# Patient Record
Sex: MALE | Race: ASIAN | NOT HISPANIC OR LATINO | URBAN - METROPOLITAN AREA
[De-identification: names, ages, dates, MRNs, and addresses within clinical notes are randomized per-mention and may not be internally consistent; named-entity substitution may affect disease eponyms.]

---

## 2020-04-12 ENCOUNTER — INPATIENT (INPATIENT)
Facility: HOSPITAL | Age: 55
LOS: 6 days | Discharge: HOME | DRG: 177 | End: 2020-04-19
Attending: GENERAL ACUTE CARE HOSPITAL | Admitting: HOSPITALIST
Payer: MEDICAID

## 2020-04-12 VITALS
TEMPERATURE: 98 F | DIASTOLIC BLOOD PRESSURE: 97 MMHG | SYSTOLIC BLOOD PRESSURE: 154 MMHG | OXYGEN SATURATION: 100 % | WEIGHT: 160.06 LBS | HEART RATE: 124 BPM | RESPIRATION RATE: 30 BRPM | HEIGHT: 66 IN

## 2020-04-12 DIAGNOSIS — Z98.89 OTHER SPECIFIED POSTPROCEDURAL STATES: Chronic | ICD-10-CM

## 2020-04-12 DIAGNOSIS — R06.82 TACHYPNEA, NOT ELSEWHERE CLASSIFIED: ICD-10-CM

## 2020-04-12 LAB
APTT BLD: 37.3 SEC — HIGH (ref 27.5–36.3)
BASOPHILS # BLD AUTO: 0.01 K/UL — SIGNIFICANT CHANGE UP (ref 0–0.2)
BASOPHILS NFR BLD AUTO: 0.1 % — SIGNIFICANT CHANGE UP (ref 0–2)
D DIMER BLD IA.RAPID-MCNC: <150 NG/ML DDU — SIGNIFICANT CHANGE UP
EOSINOPHIL # BLD AUTO: 0.01 K/UL — SIGNIFICANT CHANGE UP (ref 0–0.5)
EOSINOPHIL NFR BLD AUTO: 0.1 % — SIGNIFICANT CHANGE UP (ref 0–6)
GAS PNL BLDV: SIGNIFICANT CHANGE UP
HCT VFR BLD CALC: 37.2 % — LOW (ref 39–50)
HGB BLD-MCNC: 12.3 G/DL — LOW (ref 13–17)
IMM GRANULOCYTES NFR BLD AUTO: 1.6 % — HIGH (ref 0–1.5)
INR BLD: 1.13 RATIO — SIGNIFICANT CHANGE UP (ref 0.88–1.16)
LYMPHOCYTES # BLD AUTO: 1.82 K/UL — SIGNIFICANT CHANGE UP (ref 1–3.3)
LYMPHOCYTES # BLD AUTO: 15.3 % — SIGNIFICANT CHANGE UP (ref 13–44)
MCHC RBC-ENTMCNC: 26.9 PG — LOW (ref 27–34)
MCHC RBC-ENTMCNC: 33.1 GM/DL — SIGNIFICANT CHANGE UP (ref 32–36)
MCV RBC AUTO: 81.4 FL — SIGNIFICANT CHANGE UP (ref 80–100)
MONOCYTES # BLD AUTO: 0.25 K/UL — SIGNIFICANT CHANGE UP (ref 0–0.9)
MONOCYTES NFR BLD AUTO: 2.1 % — SIGNIFICANT CHANGE UP (ref 2–14)
NEUTROPHILS # BLD AUTO: 9.65 K/UL — HIGH (ref 1.8–7.4)
NEUTROPHILS NFR BLD AUTO: 80.8 % — HIGH (ref 43–77)
NRBC # BLD: 0 /100 WBCS — SIGNIFICANT CHANGE UP (ref 0–0)
NT-PROBNP SERPL-SCNC: 114 PG/ML — SIGNIFICANT CHANGE UP (ref 0–300)
PLATELET # BLD AUTO: 235 K/UL — SIGNIFICANT CHANGE UP (ref 150–400)
PROTHROM AB SERPL-ACNC: 13.1 SEC — HIGH (ref 10–12.9)
RBC # BLD: 4.57 M/UL — SIGNIFICANT CHANGE UP (ref 4.2–5.8)
RBC # FLD: 14 % — SIGNIFICANT CHANGE UP (ref 10.3–14.5)
TROPONIN T, HIGH SENSITIVITY RESULT: <6 NG/L — SIGNIFICANT CHANGE UP (ref 0–51)
WBC # BLD: 11.93 K/UL — HIGH (ref 3.8–10.5)
WBC # FLD AUTO: 11.93 K/UL — HIGH (ref 3.8–10.5)

## 2020-04-12 PROCEDURE — 71045 X-RAY EXAM CHEST 1 VIEW: CPT | Mod: 26

## 2020-04-12 PROCEDURE — 99284 EMERGENCY DEPT VISIT MOD MDM: CPT

## 2020-04-12 PROCEDURE — 93010 ELECTROCARDIOGRAM REPORT: CPT

## 2020-04-12 RX ORDER — ACETAMINOPHEN 500 MG
650 TABLET ORAL EVERY 6 HOURS
Refills: 0 | Status: DISCONTINUED | OUTPATIENT
Start: 2020-04-12 | End: 2020-04-19

## 2020-04-12 RX ORDER — ACETAMINOPHEN 500 MG
975 TABLET ORAL ONCE
Refills: 0 | Status: COMPLETED | OUTPATIENT
Start: 2020-04-12 | End: 2020-04-12

## 2020-04-12 RX ADMIN — Medication 975 MILLIGRAM(S): at 22:30

## 2020-04-12 NOTE — ED ADULT NURSE NOTE - BREATHING
ANTICOAGULATION FOLLOW-UP CLINIC VISIT    Patient Name:  Hermilo Velasquez  Date:  9/26/2018  Contact Type:  Face to Face    SUBJECTIVE:     Patient Findings     Positives Unexplained INR or factor level change           OBJECTIVE    INR Protime   Date Value Ref Range Status   09/26/2018 3.8 (A) 0.86 - 1.14 Final       ASSESSMENT / PLAN  INR assessment SUPRA    Recheck INR In: 1 WEEK    INR Location Clinic      Anticoagulation Summary as of 9/26/2018     INR goal 2.0-3.0   Today's INR 3.8!   Warfarin maintenance plan 5 mg (5 mg x 1) every day   Full warfarin instructions 5 mg every day   Weekly warfarin total 35 mg   Weekly max warfarin dose 45 mg   Plan last modified Janneth Allen RN (8/22/2017)   Next INR check 10/3/2018   Target end date Indefinite    Indications   Long-term (current) use of anticoagulants [Z79.01] [Z79.01]  Pulmonary embolism  bilateral (H) [I26.99]  Paroxysmal atrial fibrillation (H) [I48.0]         Anticoagulation Episode Summary     INR check location [facility]    Preferred lab     Send INR reminders to [facility] ANTICOAGUL[facility]    Comments       Anticoagulation Care Providers     Provider Role Specialty Phone number    Karson Bishop MD Responsible Internal Medicine [phone]            See the Encounter Report to view Anticoagulation Flowsheet and Dosing Calendar (Go to Encounters tab in chart review, and find the Anticoagulation Therapy Visit)    Pt is 3.8 today. Discussed with Yamilex Gutierrez RN. Will have pt take 5 mg daily and recheck in 1 week. Pt given a handout of signs and sxs of bleed and stroke. Pt denies any changes in health, diet,medication or activity.    Mesha Bah, RN                spontaneous/dyspnea upon exertion

## 2020-04-12 NOTE — ED PROVIDER NOTE - PHYSICAL EXAMINATION
Gen: closing eyes, slow to answer questions, sad affect. well developed male  Head: NCAT  HEENT: PERRL, MMM, normal conjunctiva, anicteric, neck supple  Lung: CTAB, no adventitious sounds  CV: tachycardic, no murmurs, rubs or gallops  Abd: soft, NTND, no rebound or guarding, no CVAT  MSK: No edema, no visible deformities  Neuro: No focal neurologic deficits. CN II-XII grossly intact. 5/5 strength and normal sensation in all extremities.  Skin: Warm and dry, no evidence of rash  Psych: anxious and sad mood and affect

## 2020-04-12 NOTE — ED PROVIDER NOTE - OBJECTIVE STATEMENT
54yo M with no pmh presents with SOB. 7 days of fevers, cough, SOB. yesterday had episode of n/v/d, no blood appreciated. Wife works at [employer], with similar sxs. Brother recently passed away from COVID, patient is concerned due to recent death. +chest pressure, EKG by EMS WNL.

## 2020-04-12 NOTE — ED PROVIDER NOTE - ATTENDING CONTRIBUTION TO CARE
MD Parkinson:  patient seen and evaluated with the resident.  I was present for key portions of the History & Physical, and I agree with the Impression & Plan.  54 yo M c/o cough, SOB, body aches, runny nose, and fever.  Context:  +wife with COVID; brother just  of COVID after 1mos in an ICU.  Associated Symptoms:  +MCDONALD, & chest tightness  Duration of symptoms: 1wk  Modifiers:  laying flat, exertion.  VS:  92% on ambulation, 96% at rest.  Physical Exam:  adult M, very SOB-appearing, NCAT, mild respiratory distress, +bibasilar crackles, no lower extremity edema.  Impression/Plan:  Viral pneumonia with hypoxia, either due to a pending or confirmed COVID infection.   The patient's SOB, and work of breathing are severe enough to warrant concern for potential respiratory failure/decompensation in the near future, and therefore should be admitted to the hospital.    Plan:  Will balance aggressive oxygen therapy with CDC/ID policies to limit the viral aerosolize risk to health care providers.  Limit IVF unless hypotension requires resuscitation, as this may accelerate the ARDS process.  COVID19 swab was sent as per hospital protocol.

## 2020-04-12 NOTE — ED ADULT NURSE NOTE - OBJECTIVE STATEMENT
56 yo M w/ no PMHx presents to ED via EMS from home c/o SOB/cough/body aches. Pt reports symptoms began last week, Monday. Last fever known was yesterday to 101, last took tylenol today at 1600. Pt reports mild chest pain w/ breathing. EMS reports pt SpO2 96% on room air at rest, dropped to 90% on room air after ambulating to ambulance for transport. Pt wife at home w/ similar symptoms, was swabbed for covid yesterday, pending results. Pt brother  of covid 1 week ago. Pt reports dry cough. No recent travel reported. Pt reports occasional diarrhea over past several days. No changes in PO intake. Pt denies any N/V, fever, chills, urinary complaints, constipation, HA, dizziness, weakness. Pt A&Ox4, lungs CTA, +central pulses. Abdomen soft, not tender, not distended. Ambulating w/ steady gait, safety and comfort maintained, no acute distress noted at this time. 56 yo M w/ no PMHx presents to ED via EMS from home c/o SOB/cough/body aches. Pt reports symptoms began last week, Monday. Last fever known was yesterday to 101, last took tylenol today at 1600. Pt reports mild chest pain w/ breathing. EMS reports pt SpO2 96% on room air at rest, dropped to 90% on room air after ambulating to ambulance for transport. Pt wife works at [employer] is at home w/ similar symptoms , was swabbed for covid yesterday, pending results. Pt brother  of covid 1 week ago. Pt reports dry cough. No recent travel reported. Pt reports occasional diarrhea yesterday, resolved today. No changes in PO intake. Pt arrives on O2 4 LPM via nasal canula by EMS, IV access established by EMS 20G R AC w/ 1L NS administered PTA. Upon arrival pt tachycardic and tachypneic. Sinus tachycardia on cardiac monitor. Pt denies any N/V, fever, chills, urinary complaints, constipation, HA, dizziness, weakness. Pt A&Ox4, lungs CTA, +central pulses. Abdomen soft, not tender, not distended. Ambulating w/ steady gait, safety and comfort maintained.

## 2020-04-12 NOTE — ED PROVIDER NOTE - NS ED ROS FT
ROS: denies HA, weakness, dizziness, diaphoresis, abdominal pain, joint pain, neuro deficits, dysuria/hematuria, rash    +sob, chest pressure, f/c, cough, diarrhea

## 2020-04-13 DIAGNOSIS — R74.0 NONSPECIFIC ELEVATION OF LEVELS OF TRANSAMINASE AND LACTIC ACID DEHYDROGENASE [LDH]: ICD-10-CM

## 2020-04-13 DIAGNOSIS — L40.9 PSORIASIS, UNSPECIFIED: ICD-10-CM

## 2020-04-13 DIAGNOSIS — R68.89 OTHER GENERAL SYMPTOMS AND SIGNS: ICD-10-CM

## 2020-04-13 DIAGNOSIS — R73.9 HYPERGLYCEMIA, UNSPECIFIED: ICD-10-CM

## 2020-04-13 DIAGNOSIS — Z29.9 ENCOUNTER FOR PROPHYLACTIC MEASURES, UNSPECIFIED: ICD-10-CM

## 2020-04-13 DIAGNOSIS — Z02.9 ENCOUNTER FOR ADMINISTRATIVE EXAMINATIONS, UNSPECIFIED: ICD-10-CM

## 2020-04-13 DIAGNOSIS — E87.6 HYPOKALEMIA: ICD-10-CM

## 2020-04-13 LAB
ALBUMIN SERPL ELPH-MCNC: 3.3 G/DL — SIGNIFICANT CHANGE UP (ref 3.3–5)
ALBUMIN SERPL ELPH-MCNC: 3.4 G/DL — SIGNIFICANT CHANGE UP (ref 3.3–5)
ALP SERPL-CCNC: 127 U/L — HIGH (ref 40–120)
ALP SERPL-CCNC: 127 U/L — HIGH (ref 40–120)
ALT FLD-CCNC: 123 U/L — HIGH (ref 10–45)
ALT FLD-CCNC: 143 U/L — HIGH (ref 10–45)
ANION GAP SERPL CALC-SCNC: 15 MMOL/L — SIGNIFICANT CHANGE UP (ref 5–17)
ANION GAP SERPL CALC-SCNC: 17 MMOL/L — SIGNIFICANT CHANGE UP (ref 5–17)
AST SERPL-CCNC: 123 U/L — HIGH (ref 10–40)
AST SERPL-CCNC: 156 U/L — HIGH (ref 10–40)
BILIRUB SERPL-MCNC: 0.3 MG/DL — SIGNIFICANT CHANGE UP (ref 0.2–1.2)
BILIRUB SERPL-MCNC: 0.4 MG/DL — SIGNIFICANT CHANGE UP (ref 0.2–1.2)
BUN SERPL-MCNC: 5 MG/DL — LOW (ref 7–23)
BUN SERPL-MCNC: 7 MG/DL — SIGNIFICANT CHANGE UP (ref 7–23)
CALCIUM SERPL-MCNC: 8.6 MG/DL — SIGNIFICANT CHANGE UP (ref 8.4–10.5)
CALCIUM SERPL-MCNC: 8.8 MG/DL — SIGNIFICANT CHANGE UP (ref 8.4–10.5)
CHLORIDE SERPL-SCNC: 100 MMOL/L — SIGNIFICANT CHANGE UP (ref 96–108)
CHLORIDE SERPL-SCNC: 97 MMOL/L — SIGNIFICANT CHANGE UP (ref 96–108)
CK SERPL-CCNC: 111 U/L — SIGNIFICANT CHANGE UP (ref 30–200)
CO2 SERPL-SCNC: 20 MMOL/L — LOW (ref 22–31)
CO2 SERPL-SCNC: 22 MMOL/L — SIGNIFICANT CHANGE UP (ref 22–31)
CREAT SERPL-MCNC: 0.56 MG/DL — SIGNIFICANT CHANGE UP (ref 0.5–1.3)
CREAT SERPL-MCNC: 0.7 MG/DL — SIGNIFICANT CHANGE UP (ref 0.5–1.3)
CRP SERPL-MCNC: 23.06 MG/DL — HIGH (ref 0–0.4)
FERRITIN SERPL-MCNC: 1839 NG/ML — HIGH (ref 30–400)
GLUCOSE SERPL-MCNC: 136 MG/DL — HIGH (ref 70–99)
GLUCOSE SERPL-MCNC: 146 MG/DL — HIGH (ref 70–99)
HAV IGM SER-ACNC: SIGNIFICANT CHANGE UP
HBV CORE IGM SER-ACNC: SIGNIFICANT CHANGE UP
HBV SURFACE AG SER-ACNC: SIGNIFICANT CHANGE UP
HCT VFR BLD CALC: 38.1 % — LOW (ref 39–50)
HCV AB S/CO SERPL IA: 0.13 S/CO — SIGNIFICANT CHANGE UP (ref 0–0.99)
HCV AB SERPL-IMP: SIGNIFICANT CHANGE UP
HGB BLD-MCNC: 12.7 G/DL — LOW (ref 13–17)
LDH SERPL L TO P-CCNC: 447 U/L — HIGH (ref 50–242)
MCHC RBC-ENTMCNC: 27 PG — SIGNIFICANT CHANGE UP (ref 27–34)
MCHC RBC-ENTMCNC: 33.3 GM/DL — SIGNIFICANT CHANGE UP (ref 32–36)
MCV RBC AUTO: 81.1 FL — SIGNIFICANT CHANGE UP (ref 80–100)
NRBC # BLD: 0 /100 WBCS — SIGNIFICANT CHANGE UP (ref 0–0)
PLATELET # BLD AUTO: 238 K/UL — SIGNIFICANT CHANGE UP (ref 150–400)
POTASSIUM SERPL-MCNC: 3.2 MMOL/L — LOW (ref 3.5–5.3)
POTASSIUM SERPL-MCNC: 4 MMOL/L — SIGNIFICANT CHANGE UP (ref 3.5–5.3)
POTASSIUM SERPL-SCNC: 3.2 MMOL/L — LOW (ref 3.5–5.3)
POTASSIUM SERPL-SCNC: 4 MMOL/L — SIGNIFICANT CHANGE UP (ref 3.5–5.3)
PROCALCITONIN SERPL-MCNC: 13.59 NG/ML — HIGH (ref 0.02–0.1)
PROT SERPL-MCNC: 7.1 G/DL — SIGNIFICANT CHANGE UP (ref 6–8.3)
PROT SERPL-MCNC: 7.2 G/DL — SIGNIFICANT CHANGE UP (ref 6–8.3)
RBC # BLD: 4.7 M/UL — SIGNIFICANT CHANGE UP (ref 4.2–5.8)
RBC # FLD: 14 % — SIGNIFICANT CHANGE UP (ref 10.3–14.5)
SARS-COV-2 RNA SPEC QL NAA+PROBE: DETECTED
SODIUM SERPL-SCNC: 135 MMOL/L — SIGNIFICANT CHANGE UP (ref 135–145)
SODIUM SERPL-SCNC: 136 MMOL/L — SIGNIFICANT CHANGE UP (ref 135–145)
TROPONIN T, HIGH SENSITIVITY RESULT: <6 NG/L — SIGNIFICANT CHANGE UP (ref 0–51)
WBC # BLD: 9.36 K/UL — SIGNIFICANT CHANGE UP (ref 3.8–10.5)
WBC # FLD AUTO: 9.36 K/UL — SIGNIFICANT CHANGE UP (ref 3.8–10.5)

## 2020-04-13 PROCEDURE — 99223 1ST HOSP IP/OBS HIGH 75: CPT

## 2020-04-13 PROCEDURE — 99253 IP/OBS CNSLTJ NEW/EST LOW 45: CPT

## 2020-04-13 PROCEDURE — 93010 ELECTROCARDIOGRAM REPORT: CPT

## 2020-04-13 PROCEDURE — 93010 ELECTROCARDIOGRAM REPORT: CPT | Mod: 77

## 2020-04-13 RX ORDER — DIPHENHYDRAMINE HCL 50 MG
50 CAPSULE ORAL ONCE
Refills: 0 | Status: DISCONTINUED | OUTPATIENT
Start: 2020-04-13 | End: 2020-04-19

## 2020-04-13 RX ORDER — EPINEPHRINE 0.3 MG/.3ML
0.3 INJECTION INTRAMUSCULAR; SUBCUTANEOUS ONCE
Refills: 0 | Status: DISCONTINUED | OUTPATIENT
Start: 2020-04-13 | End: 2020-04-19

## 2020-04-13 RX ORDER — ACETAMINOPHEN 500 MG
975 TABLET ORAL ONCE
Refills: 0 | Status: COMPLETED | OUTPATIENT
Start: 2020-04-13 | End: 2020-04-13

## 2020-04-13 RX ORDER — REMDESIVIR 5 MG/ML
200 INJECTION INTRAVENOUS ONCE
Refills: 0 | Status: COMPLETED | OUTPATIENT
Start: 2020-04-13 | End: 2020-04-13

## 2020-04-13 RX ORDER — SODIUM CHLORIDE 9 MG/ML
1000 INJECTION INTRAMUSCULAR; INTRAVENOUS; SUBCUTANEOUS
Refills: 0 | Status: DISCONTINUED | OUTPATIENT
Start: 2020-04-13 | End: 2020-04-13

## 2020-04-13 RX ORDER — REMDESIVIR 5 MG/ML
100 INJECTION INTRAVENOUS DAILY
Refills: 0 | Status: DISCONTINUED | OUTPATIENT
Start: 2020-04-14 | End: 2020-04-19

## 2020-04-13 RX ORDER — ENOXAPARIN SODIUM 100 MG/ML
40 INJECTION SUBCUTANEOUS DAILY
Refills: 0 | Status: DISCONTINUED | OUTPATIENT
Start: 2020-04-13 | End: 2020-04-14

## 2020-04-13 RX ORDER — POTASSIUM CHLORIDE 20 MEQ
20 PACKET (EA) ORAL
Refills: 0 | Status: COMPLETED | OUTPATIENT
Start: 2020-04-13 | End: 2020-04-13

## 2020-04-13 RX ADMIN — Medication 20 MILLIEQUIVALENT(S): at 01:38

## 2020-04-13 RX ADMIN — ENOXAPARIN SODIUM 40 MILLIGRAM(S): 100 INJECTION SUBCUTANEOUS at 12:38

## 2020-04-13 RX ADMIN — Medication 20 MILLIEQUIVALENT(S): at 00:45

## 2020-04-13 RX ADMIN — SODIUM CHLORIDE 70 MILLILITER(S): 9 INJECTION INTRAMUSCULAR; INTRAVENOUS; SUBCUTANEOUS at 02:49

## 2020-04-13 RX ADMIN — REMDESIVIR 500 MILLIGRAM(S): 5 INJECTION INTRAVENOUS at 17:30

## 2020-04-13 RX ADMIN — Medication 650 MILLIGRAM(S): at 22:08

## 2020-04-13 RX ADMIN — Medication 650 MILLIGRAM(S): at 02:49

## 2020-04-13 RX ADMIN — Medication 975 MILLIGRAM(S): at 17:30

## 2020-04-13 NOTE — H&P ADULT - PROBLEM SELECTOR PLAN 1
See above.   COVID-19 assay pending.    Would consider CTT chest if assay negative (possible false negative) or with clinical change.   Will DEFER consideration of investigational therapies to the DAY PROVIDER.  Would consider formal ID evaluation in the AM.

## 2020-04-13 NOTE — CONSULT NOTE ADULT - SUBJECTIVE AND OBJECTIVE BOX
Patient is a 55y old  Male who presents with a chief complaint of Fever, cough, dyspnoea for 7 days, poor PO, chest pain. (13 Apr 2020 00:35)      HPI:  NIGHT HOSPITALIST:   Patient is a  54 y/o M--patient followed by his physician in [place], [place]--patient with a history of plaque psoriasis NOT on any systemic treatment or immunosuppressants for his psoriasis (on reported over the counter Rx but is not sure of Rx) with patient self referring to [place] following 7 days of persistent intermittent fever, dry cough, progressive dyspnoea with poor PO intake and intermittent chest pain.  Patient with recent loss of his brother from COVID-19 and patient's spouse works at [employer]. (13 Apr 2020 00:35)      PAST MEDICAL & SURGICAL HISTORY:  Psoriasis  S/P hemorrhoidectomy      Social history:   from [facility]  denies smoking or drinking             FAMILY HISTORY:  FH: diabetes mellitus      REVIEW OF SYSTEMS  General:	fevers , malaise  , chills    Skin:No rash  	  Ophthalmologic:Denies any visual complaints,discharge redness or photophobia  	  ENMT:No nasal discharge,headache,sinus congestion or throat pain.No dental complaints    Respiratory and Thorax:N cough, SOB  	  Cardiovascular:	No chest pain,palpitaions or dizziness    Gastrointestinal:	NO nausea,abdominal pain or diarrhea.    Genitourinary:	No dysuria,frequency. No flank pain    Musculoskeletal:	No joint swelling or pain.No weakness    Neurological:No confusion,diziness.No extremity weakness.No bladder or bowel incontinence	    Psychiatric:No delusions or hallucinations	    Hematology/Lymphatics:	No LN swelling.No gum bleeding     Endocrine:	No recent weight gain or loss.No abnormal heat/cold intolerance    Allergic/Immunologic:	No hives or rash     Allergies    No Known Allergies    Intolerances        Antimicrobials:       MEDICATIONS  (prior antimicrobials ):        MEDICATIONS  (STANDING):  enoxaparin Injectable 40 milliGRAM(s) SubCutaneous daily    MEDICATIONS  (PRN):  acetaminophen   Tablet .. 650 milliGRAM(s) Oral every 6 hours PRN Temp greater or equal to 38C (100.4F), Mild Pain (1 - 3)        Vital Signs Last 24 Hrs  T(C): 36.9 (13 Apr 2020 12:06), Max: 38.2 (13 Apr 2020 02:34)  T(F): 98.5 (13 Apr 2020 12:06), Max: 100.8 (13 Apr 2020 02:34)  HR: 115 (13 Apr 2020 12:06) (106 - 125)  BP: 138/89 (13 Apr 2020 12:06) (134/84 - 163/94)  BP(mean): --  RR: 20 (13 Apr 2020 12:06) (20 - 30)  SpO2: 97% (13 Apr 2020 12:06) (94% - 100%)  91 % on room air  PHYSICAL EXAM:Pleasant patient uncomfortable      Constitutional:Comfortable.Awake and alert  No cachexia     Eyes:PERRL EOMI.NO discharge or conjunctival injection    ENMT:No sinus tenderness.No thrush.No pharyngeal exudate     Neck:Supple,No LN,no JVD      Respiratory:Good air entry bilaterally,CTA    Cardiovascular:S1 S2  tachycardic    Gastrointestinal:Soft BS(+) no tenderness      Extremities: deformities of fingers from "arthritis"    Vascular:peripheral pulses felt    Neurological:AAO X 3,No grossly focal deficits    Skin: psoriatic plaques    Lymph Nodes:No palpable LNs    Musculoskeletal:No joint swelling or LOM    Psychiatric:Affect normal.                            12.7   9.36  )-----------( 238      ( 13 Apr 2020 12:25 )             38.1     LIVER FUNCTIONS - ( 13 Apr 2020 12:25 )  Alb: 3.3 g/dL / Pro: 7.2 g/dL / ALK PHOS: 127 U/L / ALT: 123 U/L / AST: 123 U/L / GGT: x             04-13    135  |  100  |  7   ----------------------------<  146<H>  4.0   |  20<L>  |  0.56    Ca    8.8      13 Apr 2020 12:25    TPro  7.2  /  Alb  3.3  /  TBili  0.3  /  DBili  x   /  AST  123<H>  /  ALT  123<H>  /  AlkPhos  127<H>  04-13    Creatine Kinase, Serum: 111 U/L (04-12 @ 22:54)      Procalcitonin, Serum (04.12.20 @ 22:54)    Procalcitonin, Serum: 13.59: This assay is intended for use to determine the change of PCT over time  as an aid in assessing the cumulative 28-day risk of all-cause mortality  for patients diagnosed with severe sepsis or septic shock in the ICU, or  when obtained in the emergency department or other medical wards prior to  ICU admission. This assay was performed by the Roche Elecsys BRAHMS PCT  assay. ng/mL      MICROBIOLOGY:    COVID-19 PCR . (04.12.20 @ 23:05)    COVID-19 PCR: Detected: This test has been validated by [employer] to be accurate;  though it has not been FDA cleared/approved by the usual pathway.  As with all laboratory tests, results should be correlated with clinical  findings.  https://www.fda.gov/media/923847/download  https://www.fda.gov/media/507928/download          Radiology:    < from: Xray Chest 1 View- PORTABLE-Urgent (04.12.20 @ 22:55) >  EXAM:  XR CHEST PORTABLE URGENT 1V                            PROCEDURE DATE:  04/12/2020            INTERPRETATION:  CLINICAL INFORMATION: Shortness of breath    TECHNIQUE: AP view of the chest.    COMPARISON: Chest x-ray dated 6/3/2011    FINDINGS:     Bilateral patchy opacities in a peripheral distribution which can be seen in the setting of viral pneumonia.  The cardiomediastinal silhouette is normal in size  The visualized osseous structures are unremarkable for age.    IMPRESSION:     Bilateral patchy peripheral opacities which can be seen in the setting of viral pneumonia such as COVID 19.        LIVIA LAMBERT M.D., RADIOLOGY RESIDENT  This document has been electronically signed.  LINDA CEDILLO M.D., ATTENDING RADIOLOGIST  This document has been electronically signed. Apr 13 2020  6:43AM        < end of copied text >    Urinalysis (12.18.15 @ 07:50)    Color: PLYEL    Urine Appearance: CLEAR    Glucose: NEGATIVE    Bilirubin: NEGATIVE    Ketone - Urine: NEGATIVE    Specific Gravity: 1.009    Blood: NEGATIVE    pH - Urine: 6.0    Protein, Urine: NEGATIVE    Urobilinogen: NORMAL E.U.    Nitrite: NEGATIVE    Leukocyte Esterase Concentration: NEGATIVE    White Blood Cell - Urine: 0-2    Mucus: FEW    Squamous Epithelial: OCC

## 2020-04-13 NOTE — H&P ADULT - NSHPPHYSICALEXAM_GEN_ALL_CORE
Physical exam with a nontoxic but ill appearing M.    Tm 100.4F.   HR  110    RR 16.    BP  137/92    100% on NC.    HEENT< PERRL< EOMI  Neck supple  NO thyromegaly  Chest with decreased breath sounds at the bases  Cor s1 s2 tachycardia.  Abdomen soft nontender, normal bowel sounds. Physical exam with a nontoxic but ill appearing M.    Tm 100.4F.   HR  110    RR 16.    BP  137/92    100% on NC.    HEENT< PERRL< EOMI  Neck supple  NO thyromegaly  Chest with decreased breath sounds at the bases  Cor s1 s2 tachycardia.  Abdomen soft nontender, normal bowel sounds.  Skin, dry,  healed areas of plaque psoriasis LEFT elbow and b/L anterior distal shins.  NO clear evidence of infection.  Neurologic AxOx3.  Speech fluent.  Cognition intact.  UE/LE 5/5.  NO SI/HI> but tearful.

## 2020-04-13 NOTE — CONSULT NOTE ADULT - ASSESSMENT
Patient is a  54 y/o M--patient followed by his physician in [place], [place]--patient with a history of plaque psoriasis NOT on any systemic treatment or immunosuppressants for his psoriasis (on reported over the counter Rx but is not sure of Rx) with patient self referring to [place] following 7 days of persistent intermittent fever, dry cough, progressive dyspnoea with poor PO intake and intermittent chest pain.  Patient with recent loss of his brother from COVID-19 and patient's spouse works at [employer]. (13 Apr 2020 00:35)    Pt has agreed to participate in a clinical trial   In order to participate in this clinical trial , the patient should not take concurrent treatment with other agents with actual or possible direct acting antiviral activity against SARS-CoV-2 such a chloroquine or hydroxychloroquine    Pt with elevated procalcitonin- blood cultures pending  check sputum culture  u/a is negative    please closely monitor markers of inflammation such as ferritin, d- dimer and CRP    Pt with elevated LFTs, likely from the COVID  will continue to monitor as well as renal function, while on trial drug    continue to monitor O2 requirements Patient is a  54 y/o M--patient followed by his physician in [place], [place]--patient with a history of plaque psoriasis NOT on any systemic treatment or immunosuppressants for his psoriasis (on reported over the counter Rx but is not sure of Rx) with patient self referring to [place] following 7 days of persistent intermittent fever, dry cough, progressive dyspnoea with poor PO intake and intermittent chest pain.  Patient with recent loss of his brother from COVID-19 and patient's spouse works at [employer]. (13 Apr 2020 00:35)    Pt has agreed to participate in a clinical trial   In order to participate in this clinical trial , the patient should not take concurrent treatment with other agents with actual or possible direct acting antiviral activity against SARS-CoV-2 such a chloroquine or hydroxychloroquine    Pt with elevated procalcitonin- blood cultures pending  check sputum culture  u/a is negative    please closely monitor markers of inflammation such as ferritin, d- dimer and CRP    Pt with elevated LFTs, likely from the COVID  will continue to monitor as well as renal function, while on trial drug    continue to monitor O2 requirements  Ordinal scale is 4

## 2020-04-13 NOTE — H&P ADULT - NSHPSOURCEINFOTX_GEN_ALL_CORE
Reviewed Medex with patient--patient is on no medications.  Left general message with spouse by phone. Reviewed Medex with patient--patient is on no medications.  Left general message with spouse by phone

## 2020-04-13 NOTE — H&P ADULT - NSHPLABSRESULTS_GEN_ALL_CORE
WBC 11.9.  80 % N, no bands.    Hgb 12.3    Platelets of 235K.    INR 1.1.    D dimer < 150    Lactate 2.0    Troponin nil x 1.    BNP nil.    COVID-19 PCR pending.    K+ 3.2>>supplementation ordered.    Random glucose of 132.    Cr 0.7.    Alb 3.4.    Alk phos 127      LDH  447  Procalcitonin 13    EKG tracing reviewed with sinus tachycardia at 123  QTc 472.    Sohail radiograph reviewed with bilateral patchy opacities.

## 2020-04-13 NOTE — H&P ADULT - ASSESSMENT
NIGHT HOSPITALIST:   History of plaque psoriasis but not on or previously on systemic treatment in the setting of suspected COVID-19 infection in the setting of possible community spread with patient's brother recently succumbing to COVID-19 and with spouse apparently works currently at [employer]>    COVID-19 assay sent>>would consider CTT chest if COVID-19 negative (then possible false negative) and would consider formal ID evaluation in the AM.   At present, patient with mixed indices of inflammatory markers (elevated procalcitonin but non-diagnostic D-dimer, BNP, lactate, no left shift or bandemia) to suggest concomitant bacterial superinfection>>will HOLD empiric IV antibiotics for now unless clinical change warrants as such.   Blood cultures sent.    Reviewed with patient the issue of current investigational therapies in the setting of possible or documented COVID-19 infection with patient not decided as such.   Will DEFER such investigational considerations for now, with the DAY PROVIDER to review, +/- ID input.    Will supplement K+ and check HgbA1c.   Patient wishes a clear diet, but will assume aspiration risk for now. NIGHT HOSPITALIST:   History of plaque psoriasis but not on or previously on systemic treatment for his psoriasis in the setting of suspected COVID-19 infection in the setting of possible community spread with patient's brother recently succumbing to COVID-19 and with spouse apparently works currently at [employer]>    COVID-19 assay sent>>would consider CTT chest if COVID-19 negative (then would be a possible false negative) and would consider formal ID evaluation in the AM.   At present, patient with mixed indices of inflammatory markers (elevated procalcitonin but non-diagnostic D-dimer, BNP, lactate, no left shift or bandemia) to suggest concomitant bacterial superinfection>>will HOLD empiric IV antibiotics for now unless clinical change warrants as such.   Blood cultures sent.    Reviewed with patient the issue of current investigational therapies in the setting of possible or documented COVID-19 infection with patient not decided as such.   Will DEFER such investigational considerations for now, with the DAY PROVIDER to review, +/- ID input.    Will supplement K+ and check HgbA1c.   Patient wishes a clear diet, but will assume aspiration risk for now.

## 2020-04-13 NOTE — H&P ADULT - ATTENDING COMMENTS
NIGHT HOSPITALIST:    Patient / spouse aware of course and agree with plan/care as above.   Given patient's comorbidities, patient's prognosis is guarded.   Emotional support provided to patient.    Care assumed by the DAY PROVIDER.    Contact card provided to patient.   General message left with patient's spouse by phone.    Baldev Batres MD  [phone] NIGHT HOSPITALIST:    Patient / spouse aware of course and agree with plan/care as above.   Given patient's comorbidities, patient's prognosis is guarded.   Emotional support provided to patient.    Care reviewed with covering NP, Chasity.  Care assumed by the DAY PROVIDER.    Contact card provided to patient.   General message left with patient's spouse by phone.    Baldev Batres MD  [phone]

## 2020-04-13 NOTE — CHART NOTE - NSCHARTNOTEFT_GEN_A_CORE
Pt provided informed consent for clinical trial enrollment.  Details of the study explained  Pt given ample time to ask questions and read consent . A copy of the consent was left with the patient. Pt verbalized understanding and all questions were answered.

## 2020-04-13 NOTE — H&P ADULT - NSHPREVIEWOFSYSTEMS_GEN_ALL_CORE
Fevers as above.  NO chest pain/pressure at present.  NO palpitations.  NO HA, no focal weakness.  NO abdominal pain.   Occasional nausea.   No red blood per rectum or melena.  NO diarrhoea.  No change in chronic plaque psoriasis.    NO thyroid symptoms.  No joint pain.  No dysuria, no haematuria.  NO SI/HI>  Sick contacts as above.

## 2020-04-13 NOTE — H&P ADULT - HISTORY OF PRESENT ILLNESS
NIGHT HOSPITALIST:   Patient UNKNOWN to me previously, assigned to me at this point via the ER to admit this 54 y/o M--patient followed by his physician in [place], [place]--patient with a history of plaque psoriasis NOT on any systemic treatment (on reported over the counter Rx but is not sure of Rx) with patient self referring to [place] following 7 days of persistent intermittent fever, dry cough, progressive dyspnoea with poor PO intake and intermittent chest pain.  Patient with recent loss of his brother from COVID-19 and patient's spouse works at [employer]. NIGHT HOSPITALIST:   Patient UNKNOWN to me previously, assigned to me at this point via the ER to admit this 54 y/o M--patient followed by his physician in [place], [place]--patient with a history of plaque psoriasis NOT on any systemic treatment or immunosuppressants for his psoriasis (on reported over the counter Rx but is not sure of Rx) with patient self referring to [place] following 7 days of persistent intermittent fever, dry cough, progressive dyspnoea with poor PO intake and intermittent chest pain.  Patient with recent loss of his brother from COVID-19 and patient's spouse works at [employer].

## 2020-04-14 LAB
ALBUMIN SERPL ELPH-MCNC: 2.8 G/DL — LOW (ref 3.3–5)
ALP SERPL-CCNC: 123 U/L — HIGH (ref 40–120)
ALT FLD-CCNC: 88 U/L — HIGH (ref 10–45)
ANION GAP SERPL CALC-SCNC: 15 MMOL/L — SIGNIFICANT CHANGE UP (ref 5–17)
ANION GAP SERPL CALC-SCNC: 15 MMOL/L — SIGNIFICANT CHANGE UP (ref 5–17)
AST SERPL-CCNC: 76 U/L — HIGH (ref 10–40)
BILIRUB SERPL-MCNC: 0.4 MG/DL — SIGNIFICANT CHANGE UP (ref 0.2–1.2)
BUN SERPL-MCNC: 11 MG/DL — SIGNIFICANT CHANGE UP (ref 7–23)
BUN SERPL-MCNC: 13 MG/DL — SIGNIFICANT CHANGE UP (ref 7–23)
CALCIUM SERPL-MCNC: 8.7 MG/DL — SIGNIFICANT CHANGE UP (ref 8.4–10.5)
CALCIUM SERPL-MCNC: 8.9 MG/DL — SIGNIFICANT CHANGE UP (ref 8.4–10.5)
CHLORIDE SERPL-SCNC: 101 MMOL/L — SIGNIFICANT CHANGE UP (ref 96–108)
CHLORIDE SERPL-SCNC: 102 MMOL/L — SIGNIFICANT CHANGE UP (ref 96–108)
CO2 SERPL-SCNC: 18 MMOL/L — LOW (ref 22–31)
CO2 SERPL-SCNC: 19 MMOL/L — LOW (ref 22–31)
CREAT SERPL-MCNC: 0.56 MG/DL — SIGNIFICANT CHANGE UP (ref 0.5–1.3)
CREAT SERPL-MCNC: 0.64 MG/DL — SIGNIFICANT CHANGE UP (ref 0.5–1.3)
D DIMER BLD IA.RAPID-MCNC: <150 NG/ML DDU — SIGNIFICANT CHANGE UP
GLUCOSE SERPL-MCNC: 80 MG/DL — SIGNIFICANT CHANGE UP (ref 70–99)
GLUCOSE SERPL-MCNC: 87 MG/DL — SIGNIFICANT CHANGE UP (ref 70–99)
HCT VFR BLD CALC: 39.8 % — SIGNIFICANT CHANGE UP (ref 39–50)
HGB BLD-MCNC: 12.6 G/DL — LOW (ref 13–17)
MAGNESIUM SERPL-MCNC: 2.3 MG/DL — SIGNIFICANT CHANGE UP (ref 1.6–2.6)
MCHC RBC-ENTMCNC: 26.3 PG — LOW (ref 27–34)
MCHC RBC-ENTMCNC: 31.7 GM/DL — LOW (ref 32–36)
MCV RBC AUTO: 83.1 FL — SIGNIFICANT CHANGE UP (ref 80–100)
NRBC # BLD: 0 /100 WBCS — SIGNIFICANT CHANGE UP (ref 0–0)
PHOSPHATE SERPL-MCNC: 4.4 MG/DL — SIGNIFICANT CHANGE UP (ref 2.5–4.5)
PLATELET # BLD AUTO: 297 K/UL — SIGNIFICANT CHANGE UP (ref 150–400)
POTASSIUM SERPL-MCNC: 4 MMOL/L — SIGNIFICANT CHANGE UP (ref 3.5–5.3)
POTASSIUM SERPL-MCNC: 4 MMOL/L — SIGNIFICANT CHANGE UP (ref 3.5–5.3)
POTASSIUM SERPL-SCNC: 4 MMOL/L — SIGNIFICANT CHANGE UP (ref 3.5–5.3)
POTASSIUM SERPL-SCNC: 4 MMOL/L — SIGNIFICANT CHANGE UP (ref 3.5–5.3)
PROT SERPL-MCNC: 6.8 G/DL — SIGNIFICANT CHANGE UP (ref 6–8.3)
RBC # BLD: 4.79 M/UL — SIGNIFICANT CHANGE UP (ref 4.2–5.8)
RBC # FLD: 14.3 % — SIGNIFICANT CHANGE UP (ref 10.3–14.5)
SODIUM SERPL-SCNC: 134 MMOL/L — LOW (ref 135–145)
SODIUM SERPL-SCNC: 136 MMOL/L — SIGNIFICANT CHANGE UP (ref 135–145)
WBC # BLD: 8.44 K/UL — SIGNIFICANT CHANGE UP (ref 3.8–10.5)
WBC # FLD AUTO: 8.44 K/UL — SIGNIFICANT CHANGE UP (ref 3.8–10.5)

## 2020-04-14 PROCEDURE — 99255 IP/OBS CONSLTJ NEW/EST HI 80: CPT | Mod: GC

## 2020-04-14 PROCEDURE — 99232 SBSQ HOSP IP/OBS MODERATE 35: CPT

## 2020-04-14 RX ORDER — METOPROLOL TARTRATE 50 MG
12.5 TABLET ORAL EVERY 6 HOURS
Refills: 0 | Status: DISCONTINUED | OUTPATIENT
Start: 2020-04-14 | End: 2020-04-15

## 2020-04-14 RX ORDER — DIGOXIN 250 MCG
0.12 TABLET ORAL DAILY
Refills: 0 | Status: DISCONTINUED | OUTPATIENT
Start: 2020-04-14 | End: 2020-04-15

## 2020-04-14 RX ORDER — METOPROLOL TARTRATE 50 MG
5 TABLET ORAL ONCE
Refills: 0 | Status: COMPLETED | OUTPATIENT
Start: 2020-04-14 | End: 2020-04-14

## 2020-04-14 RX ORDER — DIGOXIN 250 MCG
0.5 TABLET ORAL ONCE
Refills: 0 | Status: COMPLETED | OUTPATIENT
Start: 2020-04-14 | End: 2020-04-14

## 2020-04-14 RX ORDER — ENOXAPARIN SODIUM 100 MG/ML
40 INJECTION SUBCUTANEOUS
Refills: 0 | Status: DISCONTINUED | OUTPATIENT
Start: 2020-04-14 | End: 2020-04-19

## 2020-04-14 RX ORDER — SODIUM CHLORIDE 9 MG/ML
250 INJECTION INTRAMUSCULAR; INTRAVENOUS; SUBCUTANEOUS ONCE
Refills: 0 | Status: COMPLETED | OUTPATIENT
Start: 2020-04-14 | End: 2020-04-14

## 2020-04-14 RX ORDER — DIGOXIN 250 MCG
0.25 TABLET ORAL EVERY 8 HOURS
Refills: 0 | Status: COMPLETED | OUTPATIENT
Start: 2020-04-14 | End: 2020-04-14

## 2020-04-14 RX ORDER — METOPROLOL TARTRATE 50 MG
5 TABLET ORAL ONCE
Refills: 0 | Status: DISCONTINUED | OUTPATIENT
Start: 2020-04-14 | End: 2020-04-14

## 2020-04-14 RX ADMIN — SODIUM CHLORIDE 500 MILLILITER(S): 9 INJECTION INTRAMUSCULAR; INTRAVENOUS; SUBCUTANEOUS at 02:20

## 2020-04-14 RX ADMIN — ENOXAPARIN SODIUM 40 MILLIGRAM(S): 100 INJECTION SUBCUTANEOUS at 22:13

## 2020-04-14 RX ADMIN — Medication 650 MILLIGRAM(S): at 13:18

## 2020-04-14 RX ADMIN — ENOXAPARIN SODIUM 40 MILLIGRAM(S): 100 INJECTION SUBCUTANEOUS at 13:17

## 2020-04-14 RX ADMIN — REMDESIVIR 500 MILLIGRAM(S): 5 INJECTION INTRAVENOUS at 15:30

## 2020-04-14 RX ADMIN — Medication 0.25 MILLIGRAM(S): at 16:16

## 2020-04-14 RX ADMIN — Medication 0.5 MILLIGRAM(S): at 02:16

## 2020-04-14 RX ADMIN — Medication 0.25 MILLIGRAM(S): at 08:56

## 2020-04-14 RX ADMIN — Medication 5 MILLIGRAM(S): at 00:17

## 2020-04-14 RX ADMIN — Medication 5 MILLIGRAM(S): at 04:49

## 2020-04-14 NOTE — CONSULT NOTE ADULT - NSHPATTENDINGPLANDISCUSS_GEN_ALL_CORE
Jennifer Meyer- NP
Call Cardiology Fellow or Attending as listed on [email or web address] password: [email or web address].

## 2020-04-14 NOTE — CONSULT NOTE ADULT - ATTENDING COMMENTS
Carolyn Leahy M.D. ,   Pager [phone]     after 5PM/ weekends [phone]
55 year old man with COVID-19 pneumonia and onset of atrial fibrillation with rapid ventricular rate. Received IV metoprolol with limited response, then digoxin load and subsequent reverted to sinus rhythm. Conversion strip reviewed, simply terminated, no pause as sinus rhythm resumed and maintained.

## 2020-04-14 NOTE — CONSULT NOTE ADULT - REASON FOR ADMISSION
Fever, cough, dyspnoea for 7 days, poor PO, chest pain.
Fever, cough, dyspnoea for 7 days, poor PO, chest pain.

## 2020-04-14 NOTE — PROGRESS NOTE ADULT - SUBJECTIVE AND OBJECTIVE BOX
Patient is a 55y old  Male who presents with a chief complaint of Fever, cough, dyspnoea for 7 days, poor PO, chest pain. (14 Apr 2020 11:21)                                                               INTERVAL HPI/OVERNIGHT EVENTS:    REVIEW OF SYSTEMS:     CONSTITUTIONAL: No weakness, fevers or chills  RESPIRATORY: No cough, wheezing,  No shortness of breath  CARDIOVASCULAR: No chest pain or palpitations  GASTROINTESTINAL: No abdominal pain  . No nausea, vomiting, or hematemesis; No diarrhea or constipation. No melena or hematochezia.  GENITOURINARY: No dysuria, frequency or hematuria  NEUROLOGICAL: No numbness or weakness  SKIN: No itching, burning, rashes, or lesions                                                                                                                                                                                                                                                                                 Medications:  MEDICATIONS  (STANDING):  digoxin     Tablet 0.125 milliGRAM(s) Oral daily  enoxaparin Injectable 40 milliGRAM(s) SubCutaneous daily  remdesivir IVPB ([id number]) 100 milliGRAM(s) IV Intermittent daily    MEDICATIONS  (PRN):  acetaminophen   Tablet .. 650 milliGRAM(s) Oral every 6 hours PRN Temp greater or equal to 38C (100.4F), Mild Pain (1 - 3)  diphenhydrAMINE   Injectable 50 milliGRAM(s) IV Push once PRN anaphylaxis to study drug  EPINEPHrine     1 mG/mL Injectable 0.3 milliGRAM(s) IntraMuscular once PRN anaphylaxis for study drug  metoprolol tartrate 12.5 milliGRAM(s) Oral every 6 hours PRN HR>120       Allergies    No Known Allergies    Intolerances      Vital Signs Last 24 Hrs  T(C): 37 (14 Apr 2020 12:47), Max: 39.4 (13 Apr 2020 16:50)  T(F): 98.6 (14 Apr 2020 12:47), Max: 103 (13 Apr 2020 16:50)  HR: 116 (14 Apr 2020 12:47) (82 - 190)  BP: 119/80 (14 Apr 2020 12:47) (84/54 - 129/85)  BP(mean): --  RR: 18 (14 Apr 2020 12:47) (17 - 32)  SpO2: 95% (14 Apr 2020 12:47) (94% - 98%)  CAPILLARY BLOOD GLUCOSE          04-13 @ 07:01  -  04-14 @ 07:00  --------------------------------------------------------  IN: 780 mL / OUT: 1250 mL / NET: -470 mL    04-14 @ 07:01  -  04-14 @ 14:20  --------------------------------------------------------  IN: 120 mL / OUT: 250 mL / NET: -130 mL      Physical Exam:    Daily     Daily   General:  Well appearing, NAD, not cachetic  HEENT:  Nonicteric, PERRLA  CV:  RRR, S1S2   Lungs:  CTA B/L, no wheezes, rales, rhonchi  Abdomen:  Soft, non-tender, no distended, positive BS  Extremities:  2+ pulses, no c/c, no edema  Skin:  Warm and dry, no rashes  :  No henderson  Neuro:  AAOx3, non-focal, grossly intact                                                                                                                                                                                                                                                                                                LABS:                               12.6   8.44  )-----------( 297      ( 14 Apr 2020 06:40 )             39.8                      04-14    134<L>  |  101  |  13  ----------------------------<  80  4.0   |  18<L>  |  0.56    Ca    8.9      14 Apr 2020 06:39  Phos  4.4     04-14  Mg     2.3     04-14    TPro  6.8  /  Alb  2.8<L>  /  TBili  0.4  /  DBili  x   /  AST  76<H>  /  ALT  88<H>  /  AlkPhos  123<H>  04-14

## 2020-04-14 NOTE — PROGRESS NOTE ADULT - ASSESSMENT
Problem/Plan - 1:  ·  Problem: acute hypoxicv resp failllure sec to 2019 novel coronavirus infection.  Plan: See above.  now on trial with remdesivir   monitor for now   increase lovenox to bid   monitor inflamatory markers      Problem/Plan - 2:  ·  Problem: Psoriasis.  Plan: Not currently or previously on any disease modifying agents.     Problem/Plan - 3:  ·  Problem: Hypokalemia.  Plan: improved     Problem/Plan - 4:  ·  Problem: Transaminitis.  Plan: monitor     Problem/Plan - 5:  ·  Problem: Hyperglycemia.  Plan: Will check HgbA1C.     Problem/Plan - 6:  Problem: unclear why pt on digoxin : cont for now //attempt to clarify     full code

## 2020-04-14 NOTE — PROGRESS NOTE ADULT - ASSESSMENT
Patient is a  56 y/o M--patient followed by his physician in [place], [place]--patient with a history of plaque psoriasis NOT on any systemic treatment or immunosuppressants for his psoriasis (on reported over the counter Rx but is not sure of Rx) with patient self referring to [facility] following 7 days of persistent intermittent fever, dry cough, progressive dyspnoea with poor PO intake and intermittent chest pain.  Patient with recent loss of his brother from COVID-19 and patient's spouse works at [employer]. (13 Apr 2020 00:35)    Pt has agreed to participate in a clinical trial   In order to participate in this clinical trial , the patient should not take concurrent treatment with other agents with actual or possible direct acting antiviral activity against SARS-CoV-2 such a chloroquine or hydroxychloroquine    Pt with elevated procalcitonin- blood cultures pending  check sputum culture  u/a is negative    please closely monitor markers of inflammation such as ferritin, d- dimer and CRP    Pt with elevated LFTs, likely from the COVID  will continue to monitor as well as renal function, while on trial drug  LFTs are improved today c/w yesterday    continue to monitor O2 requirements  Ordinal scale is 4    Day # 2 clinical trial

## 2020-04-14 NOTE — CHART NOTE - NSCHARTNOTEFT_GEN_A_CORE
CC: New osnet Afib with RVR    Notified by RN pt HR is 190s and rhythm is rapid A.fib.  Pt seen and examined at bedside, lying comfortably in no acute distress (despite tachy), Pt reports slight chest pain that is not new and brought him into the hospital ED. Pt currently on no BB or other cardiac meds. Admits to CP and SOB.    ICU Vital Signs Last 24 Hrs  T(C): 36.8 (13 Apr 2020 23:35), Max: 39.4 (13 Apr 2020 16:50)  T(F): 98.2 (13 Apr 2020 23:35), Max: 103 (13 Apr 2020 16:50)  HR: 132 (14 Apr 2020 00:14) (106 - 190)  BP: 102/69 (14 Apr 2020 00:14) (102/69 - 144/94)  BP(mean): --  ABP: --  ABP(mean): --  RR: 32 (14 Apr 2020 00:14) (20 - 32)  SpO2: 96% (14 Apr 2020 00:14) (95% - 100%)    Physical exam:  General- AOx3 in NAD  Cardiac- irregular rhythm, tachy  Resp- CTA b/l  Ext- no b/l LE edema    Assessment:   54 YO male with PMHX of plaque psoriasis but not on or previously on systemic treatment for his psoriasis.  Pt has a negative cardiac hx as well in the setting of COVID-19 infection c/b new rapid afib.    Plan:  #New rapid Afib - likely 2/2 COVID-19 infection  - Lopressor 5 mg IVP  -More freq monitoring  -EKG- Rapid Afib with RVR HR:190  - Consult cardiology      Will continue to monitor  Attending notified  Will endorse to day team    MALAIKA FanC  Medicine team  [phone] CC: New osnet Afib with RVR    Notified by RN pt HR is 190s and rhythm is rapid A.fib.  Pt seen and examined at bedside, lying comfortably in no acute distress (despite tachy), Pt reports slight chest pain that is not new and brought him into the hospital ED. Pt currently on no BB or other cardiac meds. Admits to CP palpitations and SOB. Denies HA, abd pain, N/V.    ICU Vital Signs Last 24 Hrs  T(C): 36.8 (13 Apr 2020 23:35), Max: 39.4 (13 Apr 2020 16:50)  T(F): 98.2 (13 Apr 2020 23:35), Max: 103 (13 Apr 2020 16:50)  HR: 132 (14 Apr 2020 00:14) (106 - 190)  BP: 102/69 (14 Apr 2020 00:14) (102/69 - 144/94)  BP(mean): --  ABP: --  ABP(mean): --  RR: 32 (14 Apr 2020 00:14) (20 - 32)  SpO2: 96% (14 Apr 2020 00:14) (95% - 100%)    Physical exam:  General- AOx3 in NAD  Cardiac- irregular rhythm, tachy  Resp- CTA b/l  Ext- no b/l LE edema    Assessment:   56 YO male with PMHX of plaque psoriasis but not on or previously on systemic treatment for his psoriasis.  Pt has a negative cardiac hx as well in the setting of COVID-19 infection c/b new rapid afib.    Plan:  #New rapid Afib - likely 2/2 COVID-19 infection  - Lopressor 5 mg IVP  -More freq monitoring  -EKG- Rapid Afib with RVR HR:190  - Consult cardiology      Will continue to monitor  Attending notified  Will endorse to day team    Belle Moseley PA-C  Medicine team  [id number] CC: New osnet Afib with RVR    Notified by RN pt HR is 190s and rhythm is rapid A.fib.  Pt seen and examined at bedside, lying comfortably in no acute distress (despite tachy), Pt reports slight chest pain that is not new and brought him into the hospital ED. Pt currently on no BB or other cardiac meds. Admits to CP palpitations and SOB. Denies HA, abd pain, N/V.    ICU Vital Signs Last 24 Hrs  T(C): 36.8 (13 Apr 2020 23:35), Max: 39.4 (13 Apr 2020 16:50)  T(F): 98.2 (13 Apr 2020 23:35), Max: 103 (13 Apr 2020 16:50)  HR: 132 (14 Apr 2020 00:14) (106 - 190)  BP: 102/69 (14 Apr 2020 00:14) (102/69 - 144/94)  BP(mean): --  ABP: --  ABP(mean): --  RR: 32 (14 Apr 2020 00:14) (20 - 32)  SpO2: 96% (14 Apr 2020 00:14) (95% - 100%)    Physical exam:  General- AOx3 in NAD  Cardiac- irregular rhythm, tachy  Resp- CTA b/l  Ext- no b/l LE edema    Assessment:   56 YO male with PMHX of plaque psoriasis but not on or previously on systemic treatment for his psoriasis.  Pt has a negative cardiac hx as well in the setting of COVID-19 infection c/b new rapid afib.    Plan:  #New rapid Afib - likely 2/2 COVID-19 infection  - Lopressor 5 mg IVP  -More freq monitoring  -EKG- Rapid Afib with RVR HR:190  - Consult cardiology  -Labs: BMP, Mg, Phos pending      Will continue to monitor  Attending notified  Will endorse to day team    Belle Moseley PA-C  Medicine team  [id number] CC: New osnet Afib with RVR    Notified by RN pt HR is 190s and rhythm is rapid A.fib.  Pt seen and examined at bedside, lying comfortably in no acute distress (despite tachy), Pt reports slight chest pain that is not new and brought him into the hospital ED. Pt currently on no BB or other cardiac meds. Admits to CP palpitations and SOB. Denies HA, abd pain, N/V.    ICU Vital Signs Last 24 Hrs  T(C): 36.8 (13 Apr 2020 23:35), Max: 39.4 (13 Apr 2020 16:50)  T(F): 98.2 (13 Apr 2020 23:35), Max: 103 (13 Apr 2020 16:50)  HR: 132 (14 Apr 2020 00:14) (106 - 190)  BP: 102/69 (14 Apr 2020 00:14) (102/69 - 144/94)  BP(mean): --  ABP: --  ABP(mean): --  RR: 32 (14 Apr 2020 00:14) (20 - 32)  SpO2: 96% (14 Apr 2020 00:14) (95% - 100%)    Physical exam:  General- AOx3 in NAD  Cardiac- irregular rhythm, tachy  Resp- CTA b/l  Ext- no b/l LE edema    Assessment:   56 YO male with PMHX of plaque psoriasis but not on or previously on systemic treatment for his psoriasis.  Pt has a negative cardiac hx as well in the setting of COVID-19 infection c/b new rapid afib.    Plan:  #New rapid Afib - likely 2/2 COVID-19 infection  - Lopressor 5 mg IVP  -More freq monitoring  -EKG- Rapid Afib with RVR HR:190 vs SVT with questionable abberancy  - Consult cardiology  -Labs: BMP, Mg, Phos pending      Will continue to monitor  Attending notified  Will endorse to day team    Belle Moseley PA-C  Medicine team  [id number] CC: New osnet Afib with RVR    Notified by RN pt HR is 190s and rhythm is rapid A.fib.  Pt seen and examined at bedside, lying comfortably in no acute distress (despite tachy), Pt reports slight chest pain that is not new and brought him into the hospital ED. Pt currently on no BB or other cardiac meds. Admits to CP palpitations and SOB. Denies HA, abd pain, N/V.    ICU Vital Signs Last 24 Hrs  T(C): 36.8 (13 Apr 2020 23:35), Max: 39.4 (13 Apr 2020 16:50)  T(F): 98.2 (13 Apr 2020 23:35), Max: 103 (13 Apr 2020 16:50)  HR: 132 (14 Apr 2020 00:14) (106 - 190)  BP: 102/69 (14 Apr 2020 00:14) (102/69 - 144/94)  BP(mean): --  ABP: --  ABP(mean): --  RR: 32 (14 Apr 2020 00:14) (20 - 32)  SpO2: 96% (14 Apr 2020 00:14) (95% - 100%)    Physical exam:  General- AOx3 in NAD  Cardiac- irregular rhythm, tachy  Resp- CTA b/l  Ext- no b/l LE edema    Assessment:   54 YO male with PMHX of plaque psoriasis but not on or previously on systemic treatment for his psoriasis.  Pt has a negative cardiac hx as well in the setting of COVID-19 infection c/b new rapid afib.    Plan:  #New rapid Afib - likely 2/2 COVID-19 infection  - Lopressor 5 mg IVP  -More freq monitoring  -EKG- Rapid Afib with RVR HR:190 vs SVT with questionable abberancy  - Consult cardiology  -Labs: BMP, Mg, Phos pending      Will continue to monitor  Attending notified  Will endorse to day team    Belle Moseley PA-C  Medicine team  [id number]    ADDENDUM: brief improvement with HR, down to 130s but now back to 180-190swhile BP: 87/57. Spoke to cardiology who recommend 250 cc bolus. CC: New osnet Afib with RVR    Notified by RN pt HR is 190s and rhythm is rapid A.fib.  Pt seen and examined at bedside, lying comfortably in no acute distress (despite tachy), Pt reports slight chest pain that is not new and brought him into the hospital ED. Pt currently on no BB or other cardiac meds. Admits to CP palpitations and SOB. Denies HA, abd pain, N/V.    ICU Vital Signs Last 24 Hrs  T(C): 36.8 (13 Apr 2020 23:35), Max: 39.4 (13 Apr 2020 16:50)  T(F): 98.2 (13 Apr 2020 23:35), Max: 103 (13 Apr 2020 16:50)  HR: 132 (14 Apr 2020 00:14) (106 - 190)  BP: 102/69 (14 Apr 2020 00:14) (102/69 - 144/94)  BP(mean): --  ABP: --  ABP(mean): --  RR: 32 (14 Apr 2020 00:14) (20 - 32)  SpO2: 96% (14 Apr 2020 00:14) (95% - 100%)    Physical exam:  General- AOx3 in NAD  Cardiac- irregular rhythm, tachy  Resp- CTA b/l  Ext- no b/l LE edema    Assessment:   54 YO male with PMHX of plaque psoriasis but not on or previously on systemic treatment for his psoriasis.  Pt has a negative cardiac hx as well in the setting of COVID-19 infection c/b new rapid afib.    Plan:  #New rapid Afib - likely 2/2 COVID-19 infection  - Lopressor 5 mg IVP  -More freq monitoring  -EKG- Rapid Afib with RVR HR:190 vs SVT with questionable abberancy  - Consult cardiology  -Labs: BMP, Mg, Phos pending      Will continue to monitor  Attending notified  Will endorse to day team    Belle Moseley PA-C  Medicine team  [id number]    ADDENDUM: brief improvement with HR, down to 130s but now back to 180-190swhile BP: 87/57. Spoke to cardiology who recommend 250 cc bolus in addition to digoxin loading w/ maintenance dig CC: New osnet Afib with RVR    Notified by RN pt HR is 190s and rhythm is rapid A.fib.  Pt seen and examined at bedside, lying comfortably in no acute distress (despite tachy), Pt reports slight chest pain that is not new and brought him into the hospital ED. Pt currently on no BB or other cardiac meds. Admits to CP palpitations and SOB. Denies HA, abd pain, N/V.    ICU Vital Signs Last 24 Hrs  T(C): 36.8 (13 Apr 2020 23:35), Max: 39.4 (13 Apr 2020 16:50)  T(F): 98.2 (13 Apr 2020 23:35), Max: 103 (13 Apr 2020 16:50)  HR: 132 (14 Apr 2020 00:14) (106 - 190)  BP: 102/69 (14 Apr 2020 00:14) (102/69 - 144/94)  BP(mean): --  ABP: --  ABP(mean): --  RR: 32 (14 Apr 2020 00:14) (20 - 32)  SpO2: 96% (14 Apr 2020 00:14) (95% - 100%)    Physical exam:  General- AOx3 in NAD  Cardiac- irregular rhythm, tachy  Resp- CTA b/l  Ext- no b/l LE edema    Assessment:   54 YO male with PMHX of plaque psoriasis but not on or previously on systemic treatment for his psoriasis.  Pt has a negative cardiac hx as well in the setting of COVID-19 infection c/b new rapid afib.    Plan:  #New rapid Afib - likely 2/2 COVID-19 infection  - Lopressor 5 mg IVP  -More freq monitoring  -EKG- Rapid Afib with RVR HR:190 vs SVT with questionable abberancy  - Consult cardiology  -Labs: BMP, Mg, Phos pending      Will continue to monitor  Attending notified  Will endorse to day team    Belle Moseley PA-C  Medicine team  [id number]    ADDENDUM: brief improvement with HR down to 130s but now back to 180-190swhile BP: 87/57. Spoke to cardiology who recommend 250 cc bolus in addition to digoxin loading w/ maintenance dig    TIME STAMP: 04:30 - Tele strip reviewed= AFib with -190 and BP:122/75, spoke to cardiology who recommends Lopressor 5 mg IVP. CC: New onset Afib with RVR    Notified by RN pt HR is 190s and rhythm is rapid A.fib.  Pt seen and examined at bedside, lying comfortably in no acute distress (despite tachy), Pt reports slight chest pain that is not new and brought him into the hospital ED. Pt currently on no BB or other cardiac meds. Admits to CP palpitations and SOB. Denies HA, abd pain, N/V.    ICU Vital Signs Last 24 Hrs  T(C): 36.8 (13 Apr 2020 23:35), Max: 39.4 (13 Apr 2020 16:50)  T(F): 98.2 (13 Apr 2020 23:35), Max: 103 (13 Apr 2020 16:50)  HR: 132 (14 Apr 2020 00:14) (106 - 190)  BP: 102/69 (14 Apr 2020 00:14) (102/69 - 144/94)  BP(mean): --  ABP: --  ABP(mean): --  RR: 32 (14 Apr 2020 00:14) (20 - 32)  SpO2: 96% (14 Apr 2020 00:14) (95% - 100%)    Physical exam:  General- AOx3 in NAD  Cardiac- irregular rhythm, tachy  Resp- CTA b/l  Ext- no b/l LE edema    Assessment:   56 YO male with PMHX of plaque psoriasis but not on or previously on systemic treatment for his psoriasis.  Pt has a negative cardiac hx as well in the setting of COVID-19 infection c/b new rapid afib.    Plan:  #New rapid Afib - likely 2/2 COVID-19 infection  - Lopressor 5 mg IVP  -More freq monitoring  -EKG- Rapid Afib with RVR HR:190 vs SVT with questionable abberancy  - Consult cardiology  -Labs: BMP, Mg, Phos pending      Will continue to monitor  Attending notified  Will endorse to day team    Belle Moseley PA-C  Medicine team  [id number]    ADDENDUM: brief improvement with HR down to 130s but now back to 180-190swhile BP: 87/57. Spoke to cardiology who recommend 250 cc bolus in addition to digoxin loading w/ maintenance dig    TIME STAMP: 04:30 - Tele strip reviewed= AFib with -190 and BP:122/75, spoke to cardiology who recommends Lopressor 5 mg IVP.

## 2020-04-14 NOTE — PROGRESS NOTE ADULT - SUBJECTIVE AND OBJECTIVE BOX
Patient is a 55y old  Male who presents with a chief complaint of Fever, cough, dyspnoea for 7 days, poor PO, chest pain. (14 Apr 2020 01:25)    Being followed by ID for        Interval history:  feeling improved  breathing is stable    PAST MEDICAL & SURGICAL HISTORY:  Psoriasis  S/P hemorrhoidectomy    Allergies    No Known Allergies    Intolerances      Antimicrobials:      MEDICATIONS  (STANDING):  digoxin     Tablet 0.125 milliGRAM(s) Oral daily  enoxaparin Injectable 40 milliGRAM(s) SubCutaneous daily  remdesivir IVPB ([id number]) 100 milliGRAM(s) IV Intermittent daily      Vital Signs Last 24 Hrs  T(C): 37 (04-14-20 @ 04:36), Max: 39.4 (04-13-20 @ 16:50)  T(F): 98.6 (04-14-20 @ 04:36), Max: 103 (04-13-20 @ 16:50)  HR: 102 (04-14-20 @ 08:40) (82 - 190)  BP: 113/80 (04-14-20 @ 08:40) (84/54 - 138/89)  BP(mean): --  RR: 17 (04-14-20 @ 08:40) (17 - 32)  SpO2: 94% (04-14-20 @ 08:40) (94% - 98%)  O2 sat is 97% on 2 liters  Physical Exam:    Constitutional well preserved,comfortable,pleasant    HEENT PERRLA EOMI,No pallor or icterus    No oral exudate or erythema    Neck supple no JVD or LN    Chest Good AE,CTA    CVS RRR S1 S2 WNl     Abd soft BS normal No tenderness     Ext No cyanosis clubbing or edema    IV site no erythema tenderness or discharge    Joints no swelling or LOM    CNS AAO X 3 no focal    Lab Data:                          12.6   8.44  )-----------( 297      ( 14 Apr 2020 06:40 )             39.8       04-14    134<L>  |  101  |  13  ----------------------------<  80  4.0   |  18<L>  |  0.56    Ca    8.9      14 Apr 2020 06:39  Phos  4.4     04-14  Mg     2.3     04-14    TPro  6.8  /  Alb  2.8<L>  /  TBili  0.4  /  DBili  x   /  AST  76<H>  /  ALT  88<H>  /  AlkPhos  123<H>  04-14        .Blood Blood-Venous  04-13-20   No growth to date.  --  --          WBC Count: 8.44 (04-14-20 @ 06:40)  WBC Count: 9.36 (04-13-20 @ 12:25)  WBC Count: 11.93 (04-12-20 @ 22:54)

## 2020-04-14 NOTE — CONSULT NOTE ADULT - SUBJECTIVE AND OBJECTIVE BOX
In-House Cardiology Consult Note  ---------------------------------------------  HPI:    54 y/o M PMHx plaque psoriasis not on any systemic treatment patient self referring to [place] following 7 days of persistent intermittent fever, dry cough, progressive SOB with poor PO intake and intermittent chest pain.  Patient with recent loss of his brother from COVID-19 and patient's spouse works at [employer]. Tested COVID positive as well. Requiring 2L supplemental O2. Developed new onset AFib, with HR as fast as 190s. Was provided with metoprolol 5mg IV x1, with no response. Becoming hypotensive to SBP 70s. Cardiology consulted for further recommendations.    PMHx:   Psoriasis    PSHx:   S/P hemorrhoidectomy    Allergies:  No Known Allergies    Current Medications:   acetaminophen   Tablet .. 650 milliGRAM(s) Oral every 6 hours PRN  diphenhydrAMINE   Injectable 50 milliGRAM(s) IV Push once PRN  enoxaparin Injectable 40 milliGRAM(s) SubCutaneous daily  EPINEPHrine     1 mG/mL Injectable 0.3 milliGRAM(s) IntraMuscular once PRN  remdesivir IVPB ([id number]) 100 milliGRAM(s) IV Intermittent daily    FAMILY HISTORY:  FH: diabetes mellitus    REVIEW OF SYSTEMS:  CONSTITUTIONAL: No weakness, fevers or chills  EYES/ENT: No visual changes;  No dysphagia  NECK: No pain or stiffness  RESPIRATORY: No cough, wheezing, hemoptysis; No shortness of breath  CARDIOVASCULAR: No chest pain or palpitations; No lower extremity edema  GASTROINTESTINAL: No abdominal or epigastric pain. No nausea, vomiting, or hematemesis; No diarrhea or constipation. No melena or hematochezia.  GENITOURINARY: No dysuria, frequency or hematuria  NEUROLOGICAL: No numbness or weakness  SKIN: No itching, burning, rashes, or lesions   All other review of systems is negative unless indicated above.    Physical Exam:  T(F): 98.7 (04-14), Max: 103 (04-13)  HR: 83 (04-14) (83 - 190)  BP: 87/57 (04-14) (87/57 - 144/94)  RR: 26 (04-14)  SpO2: 97% (04-14)    CXR: Personally reviewed    Labs: Personally reviewed                        12.7   9.36  )-----------( 238      ( 13 Apr 2020 12:25 )             38.1     04-13    135  |  100  |  7   ----------------------------<  146<H>  4.0   |  20<L>  |  0.56    Ca    8.8      13 Apr 2020 12:25    TPro  7.2  /  Alb  3.3  /  TBili  0.3  /  DBili  x   /  AST  123<H>  /  ALT  123<H>  /  AlkPhos  127<H>  04-13    PT/INR - ( 12 Apr 2020 22:54 )   PT: 13.1 sec;   INR: 1.13 ratio         PTT - ( 12 Apr 2020 22:54 )  PTT:37.3 sec    CARDIAC MARKERS ( 13 Apr 2020 01:43 )  <6 ng/L / x     / x     / x     / x     / x      CARDIAC MARKERS ( 12 Apr 2020 22:54 )  <6 ng/L / x     / x     / 111 U/L / x     / x        Serum Pro-Brain Natriuretic Peptide: 114 pg/mL (04-12 @ 22:54)

## 2020-04-14 NOTE — CONSULT NOTE ADULT - ASSESSMENT
56yo man with PMHx psoriasis who presented with URI symptoms. Found to be COVID positive. Cardiology consulted for management of new onset AFib.    #AFib, new onset  -RVR up to 190s is likely driven by his febrile illness.  -Became hypotensive to SBP 70s following Lopressor 5mg IV x1; received 250cc bolus IVF thereafter.  -Would attempt to achieve rate control, as his hypotension could also be from reduced diastolic filling time.  -Avoid CCB because EF is unknown (if he has undiagnosed HFrEF, this could cause HD collapse); however, no need to get echo at this time.  -Since hypotension precludes him from beta-blocker at this time, would try digoxin load. Patient has a normal renal function.  -Once systolic BP >100 and MAP >65, in addition to digoxin, can also add Lopressor 12.5mg PO q6H PRN for HR >120.  -HR goal can be liberal in setting viral illness, ~110-120bpm.  -CHADVASC score 0, so AFib itself would not an indication for therapeutic anticoagulation in this patient.    Cardiology will continue to follow.    Case to be discussed with consult attending in the AM.    Sheron Phillips MD PGY-4  Cardiology Fellow  All Cardiology service information can be found 24/7 on [email or web address], password: [email or web address]  Note is preliminary until signed by the attending. 54yo man with PMHx psoriasis who presented with URI symptoms. Found to be COVID positive. Cardiology consulted for management of new onset AFib.    #AFib, new onset  -RVR up to 190s is likely driven by his febrile illness.  -Became hypotensive to SBP 70s following Lopressor 5mg IV x1; received 250cc bolus IVF thereafter.  -Would attempt to achieve rate control, as his hypotension could also be from reduced diastolic filling time.  -Would prefer avoiding long term CCB use because EF is unknown (if he has undiagnosed HFrEF, this could cause HD collapse); no need to get echo at this time.  -Since hypotension precludes him from beta-blocker at this time, would try digoxin load. Patient has a normal renal function.  -Once systolic BP >100 and MAP >65, in addition to digoxin load+maintenance dosing, can also add Lopressor 25mg PO q6H PRN for HR >120 for now.  -HR goal can be liberal in setting viral illness, ~110-120bpm.  -CHADVASC score 0, so AFib itself would not an indication for therapeutic anticoagulation in this patient.    Cardiology will continue to follow.    Case to be discussed with consult attending in the AM.    Sheron Phillips MD PGY-4  Cardiology Fellow  All Cardiology service information can be found 24/7 on [email or web address], password: [email or web address]  Note is preliminary until signed by the attending. 56yo man with PMHx psoriasis who presented with URI symptoms. Found to be COVID positive. Cardiology consulted for management of new onset AFib.    #AFib, new onset  -EKG and telemetry strips were reviewed; tachyarrthymia appears to have wavy, undulating baseline with no discernable P-waves, narrow QRS complexes, and irregular R-R intervals.  -RVR up to 190s is likely driven by his febrile illness.  -Became hypotensive to SBP 70s following Lopressor 5mg IV x1; received 250cc bolus IVF thereafter.  -Would attempt to achieve rate control, as his hypotension could also be from reduced diastolic filling time.  -Would prefer avoiding long term CCB use because EF is unknown (if he has undiagnosed HFrEF, this could cause HD collapse); no need to get echo at this time.  -Since hypotension precludes him from beta-blocker at this time, would try rapid digoxin load. Patient has a normal renal function.  -Once systolic BP >100 and MAP >65, in addition to digoxin load+maintenance dosing, can also add Lopressor 25mg PO q6H PRN for HR >120 for now.  -HR goal can be liberal in setting viral illness, ~110-120bpm.  -CHADVASC score 0, so AFib itself would not an indication for therapeutic anticoagulation in this patient.    Cardiology will continue to follow.    Case to be discussed with consult attending in the AM.    Sheron Phillips MD PGY-4  Cardiology Fellow  All Cardiology service information can be found 24/7 on [email or web address], password: [email or web address]  Note is preliminary until signed by the attending. 56yo man with PMHx psoriasis who presented with URI symptoms. Found to be COVID positive. Cardiology consulted for management of new onset AFib.    #AFib, new onset  -EKG and telemetry strips were reviewed; tachyarrthymia appears to have wavy, undulating baseline with no discernable P-waves, narrow QRS complexes, and irregular R-R intervals.  -RVR up to 190s is likely driven by his febrile illness. Tylenol PRN and rest of supportive care as per primary team.  -Received Lopressor 5mg IV x3 in total tonight; required 250cc bolus IVF as well for transient hypotension down to SBP 70s. However, patient has been asymptomatic without palpitations or lightheadedness.  -Would prefer avoiding long term CCB use because EF is unknown (if he has undiagnosed HFrEF, this could cause HD collapse); no need to get echo at this time.  -Would try rapid digoxin load since SBPs are varying between 90-100s. Patient has a normal renal function.  -Once systolic BP is consistently >100 and MAP >65, in addition to digoxin load+maintenance dosing, can also add Lopressor 25mg PO q6H PRN for HR >120 for now.  -HR goal can be liberal in setting viral illness, ~110-120bpm.  -CHADSVASC score 0, so AFib itself would not an indication for therapeutic anticoagulation in this patient.    Cardiology will continue to follow.    Case to be discussed with consult attending in the AM.    Sheron Phillips MD PGY-4  Cardiology Fellow  All Cardiology service information can be found 24/7 on [email or web address], password: [email or web address]  Note is preliminary until signed by the attending. 54yo man with PMHx psoriasis who presented with URI symptoms. Found to be COVID positive. Cardiology consulted for management of new onset AFib.    #AFib, new onset  -EKG and telemetry strips were reviewed; tachyarrthymia appears to have wavy, undulating baseline with no discernable P-waves, narrow QRS complexes, and irregular R-R intervals.  -RVR up to 190s is likely driven by his febrile illness. Tylenol PRN and rest of supportive care as per primary team.  -Received Lopressor 5mg IV x3 in total tonight; required 250cc bolus IVF as well for transient hypotension down to SBP 70s. However, patient has been asymptomatic without palpitations or lightheadedness.  -Would prefer avoiding long term CCB use because EF is unknown (if he has undiagnosed HFrEF, this could cause HD collapse); no need to get echo at this time.  -Would try rapid digoxin load since SBPs are varying between 90-100s. Patient has a normal renal function.  -Once systolic BP is consistently >100 and MAP >65, in addition to digoxin load+maintenance dosing, can also add Lopressor 25mg PO q6H PRN for HR >120 for now.  -HR goal can be liberal in setting viral illness, ~110-120bpm.  -CHADSVASC score 0, so AFib itself would not an indication for therapeutic anticoagulation in this patient.    Cardiology will continue to follow.    Case discussed with consult attending.    Sheron Phillips MD PGY-4  Cardiology Fellow  All Cardiology service information can be found 24/7 on [email or web address], password: [email or web address]  Note is preliminary until signed by the attending.

## 2020-04-15 ENCOUNTER — TRANSCRIPTION ENCOUNTER (OUTPATIENT)
Age: 55
End: 2020-04-15

## 2020-04-15 LAB
ALBUMIN SERPL ELPH-MCNC: 2.9 G/DL — LOW (ref 3.3–5)
ALP SERPL-CCNC: 134 U/L — HIGH (ref 40–120)
ALT FLD-CCNC: 76 U/L — HIGH (ref 10–45)
ANION GAP SERPL CALC-SCNC: 15 MMOL/L — SIGNIFICANT CHANGE UP (ref 5–17)
AST SERPL-CCNC: 63 U/L — HIGH (ref 10–40)
BILIRUB SERPL-MCNC: 0.4 MG/DL — SIGNIFICANT CHANGE UP (ref 0.2–1.2)
BUN SERPL-MCNC: 15 MG/DL — SIGNIFICANT CHANGE UP (ref 7–23)
CALCIUM SERPL-MCNC: 8.5 MG/DL — SIGNIFICANT CHANGE UP (ref 8.4–10.5)
CHLORIDE SERPL-SCNC: 99 MMOL/L — SIGNIFICANT CHANGE UP (ref 96–108)
CO2 SERPL-SCNC: 20 MMOL/L — LOW (ref 22–31)
CREAT SERPL-MCNC: 0.55 MG/DL — SIGNIFICANT CHANGE UP (ref 0.5–1.3)
CRP SERPL-MCNC: 7.62 MG/DL — HIGH (ref 0–0.4)
D DIMER BLD IA.RAPID-MCNC: <150 NG/ML DDU — SIGNIFICANT CHANGE UP
DIGOXIN SERPL-MCNC: 0.7 NG/ML — LOW (ref 0.8–2)
FERRITIN SERPL-MCNC: 950 NG/ML — HIGH (ref 30–400)
GLUCOSE SERPL-MCNC: 95 MG/DL — SIGNIFICANT CHANGE UP (ref 70–99)
HCT VFR BLD CALC: 39.5 % — SIGNIFICANT CHANGE UP (ref 39–50)
HGB BLD-MCNC: 12.5 G/DL — LOW (ref 13–17)
MCHC RBC-ENTMCNC: 26.2 PG — LOW (ref 27–34)
MCHC RBC-ENTMCNC: 31.6 GM/DL — LOW (ref 32–36)
MCV RBC AUTO: 82.8 FL — SIGNIFICANT CHANGE UP (ref 80–100)
NRBC # BLD: 0 /100 WBCS — SIGNIFICANT CHANGE UP (ref 0–0)
PLATELET # BLD AUTO: 319 K/UL — SIGNIFICANT CHANGE UP (ref 150–400)
POTASSIUM SERPL-MCNC: 4.2 MMOL/L — SIGNIFICANT CHANGE UP (ref 3.5–5.3)
POTASSIUM SERPL-SCNC: 4.2 MMOL/L — SIGNIFICANT CHANGE UP (ref 3.5–5.3)
PROT SERPL-MCNC: 6.8 G/DL — SIGNIFICANT CHANGE UP (ref 6–8.3)
RBC # BLD: 4.77 M/UL — SIGNIFICANT CHANGE UP (ref 4.2–5.8)
RBC # FLD: 13.8 % — SIGNIFICANT CHANGE UP (ref 10.3–14.5)
SODIUM SERPL-SCNC: 134 MMOL/L — LOW (ref 135–145)
WBC # BLD: 7.48 K/UL — SIGNIFICANT CHANGE UP (ref 3.8–10.5)
WBC # FLD AUTO: 7.48 K/UL — SIGNIFICANT CHANGE UP (ref 3.8–10.5)

## 2020-04-15 PROCEDURE — 99232 SBSQ HOSP IP/OBS MODERATE 35: CPT

## 2020-04-15 PROCEDURE — 99233 SBSQ HOSP IP/OBS HIGH 50: CPT | Mod: GC

## 2020-04-15 RX ORDER — SENNA PLUS 8.6 MG/1
2 TABLET ORAL AT BEDTIME
Refills: 0 | Status: DISCONTINUED | OUTPATIENT
Start: 2020-04-15 | End: 2020-04-19

## 2020-04-15 RX ORDER — METOPROLOL TARTRATE 50 MG
25 TABLET ORAL
Refills: 0 | Status: DISCONTINUED | OUTPATIENT
Start: 2020-04-15 | End: 2020-04-19

## 2020-04-15 RX ORDER — POLYETHYLENE GLYCOL 3350 17 G/17G
17 POWDER, FOR SOLUTION ORAL EVERY 24 HOURS
Refills: 0 | Status: DISCONTINUED | OUTPATIENT
Start: 2020-04-15 | End: 2020-04-19

## 2020-04-15 RX ADMIN — ENOXAPARIN SODIUM 40 MILLIGRAM(S): 100 INJECTION SUBCUTANEOUS at 06:18

## 2020-04-15 RX ADMIN — SENNA PLUS 2 TABLET(S): 8.6 TABLET ORAL at 22:08

## 2020-04-15 RX ADMIN — Medication 0.12 MILLIGRAM(S): at 06:15

## 2020-04-15 RX ADMIN — Medication 25 MILLIGRAM(S): at 17:26

## 2020-04-15 RX ADMIN — ENOXAPARIN SODIUM 40 MILLIGRAM(S): 100 INJECTION SUBCUTANEOUS at 17:26

## 2020-04-15 RX ADMIN — REMDESIVIR 500 MILLIGRAM(S): 5 INJECTION INTRAVENOUS at 14:20

## 2020-04-15 NOTE — PROGRESS NOTE ADULT - SUBJECTIVE AND OBJECTIVE BOX
Interval History: Remains in sinus rhythm, tachycardic, on NC    Review Of Systems: Deferred    Medications:  acetaminophen   Tablet .. 650 milliGRAM(s) Oral every 6 hours PRN  digoxin     Tablet 0.125 milliGRAM(s) Oral daily  diphenhydrAMINE   Injectable 50 milliGRAM(s) IV Push once PRN  enoxaparin Injectable 40 milliGRAM(s) SubCutaneous two times a day  EPINEPHrine     1 mG/mL Injectable 0.3 milliGRAM(s) IntraMuscular once PRN  metoprolol tartrate 12.5 milliGRAM(s) Oral every 6 hours PRN  remdesivir IVPB ([id number]) 100 milliGRAM(s) IV Intermittent daily    Vitals:  ICU Vital Signs Last 24 Hrs  T(C): 37.5 (15 Apr 2020 05:13), Max: 37.5 (15 Apr 2020 05:13)  T(F): 99.5 (15 Apr 2020 05:13), Max: 99.5 (15 Apr 2020 05:13)  HR: 113 (15 Apr 2020 05:13) (103 - 116)  BP: 133/93 (15 Apr 2020 05:13) (119/80 - 141/94)  BP(mean): --  ABP: --  ABP(mean): --  RR: 18 (15 Apr 2020 05:13) (18 - 18)  SpO2: 98% (15 Apr 2020 05:13) (95% - 100%)    Daily     Daily Weight in k (15 Apr 2020 05:13)  I&O's Summary    2020 07:01  -  15 Apr 2020 07:00  --------------------------------------------------------  IN: 360 mL / OUT: 1000 mL / NET: -640 mL    Labs:                        12.5   7.48  )-----------( 319      ( 15 Apr 2020 07:19 )             39.5     04-15    134<L>  |  99  |  15  ----------------------------<  95  4.2   |  20<L>  |  0.55    Ca    8.5      15 Apr 2020 07:18  Phos  4.4       Mg     2.3         TPro  6.8  /  Alb  2.9<L>  /  TBili  0.4  /  DBili  x   /  AST  63<H>  /  ALT  76<H>  /  AlkPhos  134<H>  04-15    Serum Pro-Brain Natriuretic Peptide: 114 pg/mL ( @ 22:54)    Culture - Blood (collected 20 @ 09:17)  Source: .Blood Blood-Venous  Preliminary Report (20 @ 10:01):    No growth to date.    Culture - Blood (collected 20 @ 09:17)  Source: .Blood Blood-Venous  Preliminary Report (20 @ 10:01):    No growth to date.    Interpretation of Telemetry: sinus tachycardia 90-120s

## 2020-04-15 NOTE — DISCHARGE NOTE PROVIDER - HOSPITAL COURSE
55 year-old male with PMH of plaque psoriasis not on any systemic treatment, presented with c/o 7 days of persistent intermittent fever, dry cough, and progressive dyspnea a/w poor PO intake and intermittent chest pain.  Of note, patient recently lost his brother due to COVID-19 infection and patient's spouse works at [employer].  Patient was admitted for respiratory distress with tachypnea, initially received supplemental O2 and other supportive treatment, condition has improved and he is now tolerating room air.   Hospital course was notable for new onset atrial fibrillation with RVR – subsequently converted back to SR following initiation of BB and digoxin.  CHADSVASC score as calculated by Cardiology was “0,” therefore an indication for therapeutic anticoagulation.  Patient with transaminases, likely 2/2 COVID-19 infection, now downtrending. 55 year-old male with PMH of plaque psoriasis not on any systemic treatment, presented with c/o 7 days of persistent intermittent fever, dry cough, and progressive dyspnea a/w poor PO intake and intermittent chest pain.  Of note, patient recently lost his brother due to COVID-19 infection and patient's spouse works at [employer].  Patient was admitted for respiratory distress with tachypnea, initially received supplemental O2 and other supportive treatment, condition has improved and he is now tolerating room air.  Hospital course was notable for new onset atrial fibrillation with RVR – subsequently converted back to SR following initiation of BB and digoxin. CHADSVASC score as calculated by Cardiology was “0,” therefore an indication for therapeutic anticoagulation.  Patient with transaminases, likely 2/2 COVID-19 infection, now downtrending.  Remains off oxygen ambulating with oxygen saturation 92 -95% clear for discharge home to follow up with PCP 2 weeks

## 2020-04-15 NOTE — DISCHARGE NOTE PROVIDER - NSDCCPCAREPLAN_GEN_ALL_CORE_FT
PRINCIPAL DISCHARGE DIAGNOSIS  Diagnosis: COVID-19 virus detected  Assessment and Plan of Treatment:       SECONDARY DISCHARGE DIAGNOSES  Diagnosis: COVID-19 virus detected  Assessment and Plan of Treatment: You tested positive for COVID 19.  You no longer require hospitalization. You should quarantine for 14 days.  Please restrict activities outside of your home except for getting medical care.  Do not go to work, school, or public areas.  Avoid using public transportation, ride-sharing, or taxis.  Separate yourself from other people and animals in your home.  Call ahead before visiting your doctor.  Wear a facemask when you are around other people. Cover your cough and sneezes.  Clean your hands often.  Avoid sharing personal household items.  Clean all frequently touched surfaces daily. If you do not have access to your PCP at this time, please call [phone] and state that you were discharged from [facility] hospital with COVID 19. They will make a referral to the appropriate primary care practice to meet your medical needs within a couple of days after discharge. PRINCIPAL DISCHARGE DIAGNOSIS  Diagnosis: COVID-19 virus detected  Assessment and Plan of Treatment:       SECONDARY DISCHARGE DIAGNOSES  Diagnosis: New onset atrial fibrillation  Assessment and Plan of Treatment:     Diagnosis: COVID-19 virus detected  Assessment and Plan of Treatment: You tested positive for COVID 19.  You no longer require hospitalization. You should quarantine for 14 days.  Please restrict activities outside of your home except for getting medical care.  Do not go to work, school, or public areas.  Avoid using public transportation, ride-sharing, or taxis.  Separate yourself from other people and animals in your home.  Call ahead before visiting your doctor.  Wear a facemask when you are around other people. Cover your cough and sneezes.  Clean your hands often.  Avoid sharing personal household items.  Clean all frequently touched surfaces daily. If you do not have access to your PCP at this time, please call [phone] and state that you were discharged from [facility] hospital with COVID 19. They will make a referral to the appropriate primary care practice to meet your medical needs within a couple of days after discharge.

## 2020-04-15 NOTE — PROGRESS NOTE ADULT - ASSESSMENT
54 y/o M with a history of plaque psoriasis NOT on any systemic treatment or immunosuppressants for his psoriasis (on reported over the counter Rx but is not sure of Rx) with patient self referring to [facility] following 7 days of persistent intermittent fever, dry cough, progressive dyspnoea with poor PO intake and intermittent chest pain.  Patient with recent loss of his brother from COVID-19 and patient's spouse works at [employer].    Problem/Plan - 1:  ·  Problem: acute hypoxic resp failure sec to 2019 novel coronavirus infection.  Plan: now on trial with remdesivir   monitor for now   c/w full dose Lovenox to bid   monitor inflammatory markers  Ferritin downtrending  (pending CRP)    Problem/Plan - 2:  ·  Problem: Psoriasis.  Plan: Not currently or previously on any disease modifying agents.   stable, not in flare    Problem/Plan - 3:  ·  Problem: Hypokalemia.  Plan: improved     Problem/Plan - 4:  ·  Problem: Transaminitis.  Plan: monitor     Problem/Plan - 5:  ·  Problem: Hyperglycemia.  Plan: Will check HgbA1C (ordered again)     Problem/Plan - 6: A.fib with RVR, now back in sinus  Problem: s/p digoxin loaded on admission, dig level not toxic  converted to Sinus on tele.   remain tachy 110s.   start Metoprolol 25 mg BID  (already on 12.5 mg q6hr PRN)  d/c digoxin per card, f/u appreciated.     full code

## 2020-04-15 NOTE — DISCHARGE NOTE PROVIDER - NSDCACTIVITY_GEN_ALL_CORE
Patient's Personal History/Story    ON GOING LBP BUT HAS INCREASED OVER TIME; DENIES INJURY/TRAUMA; \"JUST   STARTED ON IT'S OWN\"; HAS HAD P.T.  IN THE PAST; CURRENTLY HAS DEEP TISSUE   MASSAGE TO HELP RELIEVE HER PAIN;  What Should We Know About You or Y No restrictions

## 2020-04-15 NOTE — PROGRESS NOTE ADULT - SUBJECTIVE AND OBJECTIVE BOX
Patient is a 55y old  Male who presents with a chief complaint of Fever, cough, dyspnoea for 7 days, poor PO, chest pain. (15 Apr 2020 09:55)      SUBJECTIVE / OVERNIGHT EVENTS:  --- Coverage for Dr. Mcpherson ---   comfortable  sinus on tele  remain tachy 110s  start Metoprolol 25 mg BID  (already on 12.5 mg PRN)  d/c digoxin per card  no cp, no sob, no n/v/d. no abdominal pain.  no headache, no dizziness.   on NC, comfortable      Vital Signs Last 24 Hrs  T(C): 37.2 (15 Apr 2020 10:16), Max: 37.5 (15 Apr 2020 05:13)  T(F): 98.9 (15 Apr 2020 10:16), Max: 99.5 (15 Apr 2020 05:13)  HR: 118 (15 Apr 2020 10:16) (103 - 118)  BP: 141/92 (15 Apr 2020 10:16) (119/80 - 141/94)  BP(mean): --  RR: 19 (15 Apr 2020 10:16) (18 - 19)  SpO2: 99% (15 Apr 2020 10:16) (95% - 100%)  I&O's Summary    14 Apr 2020 07:01  -  15 Apr 2020 07:00  --------------------------------------------------------  IN: 360 mL / OUT: 1000 mL / NET: -640 mL    15 Apr 2020 07:01  -  15 Apr 2020 11:12  --------------------------------------------------------  IN: 0 mL / OUT: 400 mL / NET: -400 mL        PHYSICAL EXAM:  GENERAL: NAD, Comfortable, on NC  HEAD:  Atraumatic, Normocephalic  EYES: EOMI, PERRLA, conjunctiva and sclera clear  NECK: Supple, No JVD  CHEST/LUNG: mild decrease breath sounds bilaterally; No wheeze   HEART: mild  tachy, normal rhythm; No murmurs, rubs, or gallops  ABDOMEN: Soft, Nontender, Nondistended; Bowel sounds present  Neuro: AAO x 3, no focal deficit, 5/5 b/l extremities  EXTREMITIES:  2+ Peripheral Pulses, No clubbing, cyanosis, or edema  SKIN: No rashes or lesions    LABS:                        12.5   7.48  )-----------( 319      ( 15 Apr 2020 07:19 )             39.5     04-15    134<L>  |  99  |  15  ----------------------------<  95  4.2   |  20<L>  |  0.55    Ca    8.5      15 Apr 2020 07:18  Phos  4.4     04-14  Mg     2.3     04-14    TPro  6.8  /  Alb  2.9<L>  /  TBili  0.4  /  DBili  x   /  AST  63<H>  /  ALT  76<H>  /  AlkPhos  134<H>  04-15      CAPILLARY BLOOD GLUCOSE                RADIOLOGY & ADDITIONAL TESTS:    Imaging Personally Reviewed:  [x] YES  [ ] NO    Consultant(s) Notes Reviewed:  [x] YES  [ ] NO      MEDICATIONS  (STANDING):  enoxaparin Injectable 40 milliGRAM(s) SubCutaneous two times a day  metoprolol tartrate 25 milliGRAM(s) Oral two times a day  polyethylene glycol 3350 17 Gram(s) Oral every 24 hours  remdesivir IVPB ([id number]) 100 milliGRAM(s) IV Intermittent daily  senna 2 Tablet(s) Oral at bedtime    MEDICATIONS  (PRN):  acetaminophen   Tablet .. 650 milliGRAM(s) Oral every 6 hours PRN Temp greater or equal to 38C (100.4F), Mild Pain (1 - 3)  diphenhydrAMINE   Injectable 50 milliGRAM(s) IV Push once PRN anaphylaxis to study drug  EPINEPHrine     1 mG/mL Injectable 0.3 milliGRAM(s) IntraMuscular once PRN anaphylaxis for study drug  metoprolol tartrate 12.5 milliGRAM(s) Oral every 6 hours PRN HR>120      Care Discussed with Consultants/Other Providers [x] YES  [ ] NO    HEALTH ISSUES - PROBLEM Dx:  Discharge planning issues: Discharge planning issues  Need for prophylactic measure: Need for prophylactic measure  Hyperglycemia: Hyperglycemia  Transaminitis: Transaminitis  Hypokalemia: Hypokalemia  Psoriasis: Psoriasis  Suspected 2019 novel coronavirus infection: Suspected 2019 novel coronavirus infection

## 2020-04-15 NOTE — DISCHARGE NOTE PROVIDER - NSDCMRMEDTOKEN_GEN_ALL_CORE_FT
acetaminophen 325 mg oral tablet: 2 tab(s) orally every 6 hours, As needed, Temp greater or equal to 38C (100.4F), Mild Pain (1 - 3)  ALPRAZolam 0.25 mg oral tablet: 1 tab(s) orally every 6 hours, As Needed -for anxiety MDD:4  Eliquis 2.5 mg oral tablet: 1 tab(s) orally 2 times a day x 4 weeks   metoprolol tartrate 25 mg oral tablet: 1 tab(s) orally 2 times a day  polyethylene glycol 3350 oral powder for reconstitution: 17 gram(s) orally every 24 hours  senna oral tablet: 2 tab(s) orally once a day (at bedtime), As Needed for constipation acetaminophen 325 mg oral tablet: 2 tab(s) orally every 6 hours, As needed, Temp greater or equal to 38C (100.4F), Mild Pain (1 - 3)  polyethylene glycol 3350 oral powder for reconstitution: 17 gram(s) orally every 24 hours  senna oral tablet: 2 tab(s) orally once a day (at bedtime), As Needed for constipation

## 2020-04-15 NOTE — PROGRESS NOTE ADULT - REASON FOR ADMISSION
Fever, cough, dyspnoea for 7 days, poor PO, chest pain. Fever, cough, dyspnea for 7 days, poor PO, chest pain.

## 2020-04-15 NOTE — PROGRESS NOTE ADULT - SUBJECTIVE AND OBJECTIVE BOX
Patient is a 55y old  Male who presents with a chief complaint of Fever, cough, dyspnoea for 7 days, poor PO, chest pain. (15 Apr 2020 11:11)    Being followed by ID for        Interval history:  pt feeling improved  pt upset, believes that his mother has just passed away  No other acute events        PAST MEDICAL & SURGICAL HISTORY:  Psoriasis  S/P hemorrhoidectomy    Allergies    No Known Allergies    Intolerances      Antimicrobials:      MEDICATIONS  (STANDING):  enoxaparin Injectable 40 milliGRAM(s) SubCutaneous two times a day  metoprolol tartrate 25 milliGRAM(s) Oral two times a day  polyethylene glycol 3350 17 Gram(s) Oral every 24 hours  remdesivir IVPB ([id number]) 100 milliGRAM(s) IV Intermittent daily  senna 2 Tablet(s) Oral at bedtime      Vital Signs Last 24 Hrs  T(C): 36.6 (04-15-20 @ 14:20), Max: 37.5 (04-15-20 @ 05:13)  T(F): 97.9 (04-15-20 @ 14:20), Max: 99.5 (04-15-20 @ 05:13)  HR: 116 (04-15-20 @ 14:20) (103 - 118)  BP: 156/110 (04-15-20 @ 14:20) (130/90 - 156/110)  BP(mean): --  RR: 20 (04-15-20 @ 14:20) (18 - 20)  SpO2: 98% (04-15-20 @ 14:20) (97% - 100%)  O2 sat is 97 % on 1 liter    Physical Exam:    Constitutional well preserved,comfortable,pleasant    HEENT PERRLA EOMI,No pallor or icterus    No oral exudate or erythema    Neck supple no JVD or LN    Chest Good AE,CTA    CVS RRR S1 S2 WNl     Abd soft BS normal     Ext No cyanosis clubbing or edema    IV site no erythema tenderness or discharge    Joints no swelling or LOM    CNS AAO X 3 no focal    Lab Data:                          12.5   7.48  )-----------( 319      ( 15 Apr 2020 07:19 )             39.5       04-15    134<L>  |  99  |  15  ----------------------------<  95  4.2   |  20<L>  |  0.55    Ca    8.5      15 Apr 2020 07:18  Phos  4.4     04-14  Mg     2.3     04-14    TPro  6.8  /  Alb  2.9<L>  /  TBili  0.4  /  DBili  x   /  AST  63<H>  /  ALT  76<H>  /  AlkPhos  134<H>  04-15        .Blood Blood-Venous  04-13-20   No growth to date.  --  --      WBC Count: 7.48 (04-15-20 @ 07:19)  WBC Count: 8.44 (04-14-20 @ 06:40)  WBC Count: 9.36 (04-13-20 @ 12:25)  WBC Count: 11.93 (04-12-20 @ 22:54)        COVID-19 PCR . (04.12.20 @ 23:05)    COVID-19 PCR: Detected: This test has been validated by [employer] to be accurate;  though it has not been FDA cleared/approved by the usual pathway.  As with all laboratory tests, results should be correlated with clinical  findings.  https://www.fda.gov/media/853091/download  https://www.fda.gov/media/308853/download

## 2020-04-15 NOTE — DISCHARGE NOTE PROVIDER - CARE PROVIDER_API CALL
Trino Shipley (MD)  Cardiovascular Disease; Internal Medicine; Nuclear Cardiology  [address]  [place] [address]  Phone: [phone]  Fax: [phone]  Follow Up Time:

## 2020-04-15 NOTE — PROGRESS NOTE ADULT - ASSESSMENT
56 yo man with PMH psoriasis who presented with URI symptoms. Found to be COVID positive. Cardiology consulted for management of new onset AFib, terminated to sinus rhythm, has remained in sinus since.    #Paroxysmal a Fib, new onset  - Terminated to sinus rhythm yesterday morning, has remained in sinus rhythm since  - Due to febrile illness, continue supportive care per primary team and treatment of COVID  - Recommend Lopressor 25mg BID, 12.5mg q6h PRN is a subtherapeutic dose   - s/p digoxin load for a fib with RVR, can discontinue daily digoxin  - TTE deferred, would not   - CHADSVASC score 0, no indication for therapeutic anticoagulation from a fib standpoint    Kristi Retana MD  Cardiology Fellow  [phone]  All Cardiology service information can be found 24/7 on [email or web address], password: [email or web address]

## 2020-04-15 NOTE — PROGRESS NOTE ADULT - ASSESSMENT
Patient is a  56 y/o M--patient followed by his physician in [place], [place]--patient with a history of plaque psoriasis NOT on any systemic treatment or immunosuppressants for his psoriasis (on reported over the counter Rx but is not sure of Rx) with patient self referring to [facility] following 7 days of persistent intermittent fever, dry cough, progressive dyspnoea with poor PO intake and intermittent chest pain.  Patient with recent loss of his brother from COVID-19 and patient's spouse works at [employer]. (13 Apr 2020 00:35)    Pt has agreed to participate in a clinical trial   In order to participate in this clinical trial , the patient should not take concurrent treatment with other agents with actual or possible direct acting antiviral activity against SARS-CoV-2 such a chloroquine or hydroxychloroquine  In addition , the patient should not participate in any other clinical trial of an experimental treatment for Covid-19        Pt with elevated procalcitonin- blood cultures pending  check sputum culture  u/a is negative    please closely monitor markers of inflammation such as ferritin, d- dimer and CRP, seem to be improving    Pt with elevated LFTs, likely from the COVID  will continue to monitor as well as renal function, while on trial drug  LFTs are improved today c/w yesterday    continue to monitor O2 requirements  Ordinal scale is 4    Day # 3 clinical trial

## 2020-04-16 LAB
A1C WITH ESTIMATED AVERAGE GLUCOSE RESULT: 6.4 % — HIGH (ref 4–5.6)
ALBUMIN SERPL ELPH-MCNC: 3.1 G/DL — LOW (ref 3.3–5)
ALP SERPL-CCNC: 141 U/L — HIGH (ref 40–120)
ALT FLD-CCNC: 71 U/L — HIGH (ref 10–45)
ANION GAP SERPL CALC-SCNC: 13 MMOL/L — SIGNIFICANT CHANGE UP (ref 5–17)
AST SERPL-CCNC: 59 U/L — HIGH (ref 10–40)
BILIRUB SERPL-MCNC: 0.4 MG/DL — SIGNIFICANT CHANGE UP (ref 0.2–1.2)
BUN SERPL-MCNC: 15 MG/DL — SIGNIFICANT CHANGE UP (ref 7–23)
CALCIUM SERPL-MCNC: 8.8 MG/DL — SIGNIFICANT CHANGE UP (ref 8.4–10.5)
CHLORIDE SERPL-SCNC: 100 MMOL/L — SIGNIFICANT CHANGE UP (ref 96–108)
CO2 SERPL-SCNC: 21 MMOL/L — LOW (ref 22–31)
CREAT SERPL-MCNC: 0.59 MG/DL — SIGNIFICANT CHANGE UP (ref 0.5–1.3)
ESTIMATED AVERAGE GLUCOSE: 137 MG/DL — HIGH (ref 68–114)
GLUCOSE SERPL-MCNC: 105 MG/DL — HIGH (ref 70–99)
POTASSIUM SERPL-MCNC: 4.2 MMOL/L — SIGNIFICANT CHANGE UP (ref 3.5–5.3)
POTASSIUM SERPL-SCNC: 4.2 MMOL/L — SIGNIFICANT CHANGE UP (ref 3.5–5.3)
PROT SERPL-MCNC: 7 G/DL — SIGNIFICANT CHANGE UP (ref 6–8.3)
SODIUM SERPL-SCNC: 134 MMOL/L — LOW (ref 135–145)

## 2020-04-16 PROCEDURE — 99232 SBSQ HOSP IP/OBS MODERATE 35: CPT

## 2020-04-16 RX ADMIN — Medication 25 MILLIGRAM(S): at 10:33

## 2020-04-16 RX ADMIN — REMDESIVIR 500 MILLIGRAM(S): 5 INJECTION INTRAVENOUS at 13:55

## 2020-04-16 RX ADMIN — ENOXAPARIN SODIUM 40 MILLIGRAM(S): 100 INJECTION SUBCUTANEOUS at 16:38

## 2020-04-16 RX ADMIN — ENOXAPARIN SODIUM 40 MILLIGRAM(S): 100 INJECTION SUBCUTANEOUS at 06:42

## 2020-04-16 RX ADMIN — Medication 25 MILLIGRAM(S): at 22:23

## 2020-04-16 NOTE — PROGRESS NOTE ADULT - ASSESSMENT
Patient is a  54 y/o M--patient followed by his physician in [place], [place]--patient with a history of plaque psoriasis NOT on any systemic treatment or immunosuppressants for his psoriasis (on reported over the counter Rx but is not sure of Rx) with patient self referring to [facility] following 7 days of persistent intermittent fever, dry cough, progressive dyspnoea with poor PO intake and intermittent chest pain.  Patient with recent loss of his brother from COVID-19 and patient's spouse works at [employer]. (13 Apr 2020 00:35)    Pt has agreed to participate in a clinical trial   In order to participate in this clinical trial , the patient should not take concurrent treatment with other agents with actual or possible direct acting antiviral activity against SARS-CoV-2 such a chloroquine or hydroxychloroquine  In addition , the patient should not participate in any other clinical trial of an experimental treatment for Covid-19    please closely monitor markers of inflammation such as ferritin, d- dimer and CRP, seem to be improving    Pt with elevated LFTs, likely from the COVID  will continue to monitor as well as renal function, while on trial drug   relatively stable mumbers  not of clinical significance    continue to monitor O2 requirements  Ordinal scale is 4    Day # 4 clinical trial

## 2020-04-16 NOTE — CHART NOTE - NSCHARTNOTEFT_GEN_A_CORE
Nutrition Initial Assessment    Nutrition Consult Received: Yes [   ]  No [ x  ]    Reason for Initial Nutrition Assessment: length of stay    · Reason for Admission	Fever, cough, dyspnoea for 7 days, poor PO, chest pain.      Source of Information: Unable to conduct a fact to face interview due to limited contact restrictions related to pt's medical condition and isolation precautions. Information obtained from a phone call with the pt and from the EMR.    Admitting Diagnosis: 55y Male admitted for Tachypnea    PAST MEDICAL & SURGICAL HISTORY:  Psoriasis  S/P hemorrhoidectomy      Subjective Information: spoke with pt over the phone. consuming >75% of meals and supplements. pt wishes for ensure enlive x 3 to be continued. he usually eats crackers and cheese for breakfast and rice and peguero for lunch and dinner. he snacks on chips in between meals. his lives with his wife and they both prepare the meals.       GI Issues:  Last BM: 4/14, pt had the urge at the end of our conversation    Current Nutrition Order: Regular  ensure enlive x 3 daily  PO Intake:   Good (%) [ x  ]    Fair (50-75%) [   ]    Poor (<50%) [   ]    Skin Integrity:  no pressure injury  no edema    Labs:   04-16 Na134 mmol/L<L> Glu 105 mg/dL<H> K+ 4.2 mmol/L Cr  0.59 mg/dL BUN 15 mg/dL, Alb 3.1 g/dL<L>           Medications:  MEDICATIONS  (STANDING):  enoxaparin Injectable 40 milliGRAM(s) SubCutaneous two times a day  metoprolol tartrate 25 milliGRAM(s) Oral two times a day  polyethylene glycol 3350 17 Gram(s) Oral every 24 hours  remdesivir IVPB ([id number]) 100 milliGRAM(s) IV Intermittent daily  senna 2 Tablet(s) Oral at bedtime    MEDICATIONS  (PRN):  acetaminophen   Tablet .. 650 milliGRAM(s) Oral every 6 hours PRN Temp greater or equal to 38C (100.4F), Mild Pain (1 - 3)  diphenhydrAMINE   Injectable 50 milliGRAM(s) IV Push once PRN anaphylaxis to study drug  EPINEPHrine     1 mG/mL Injectable 0.3 milliGRAM(s) IntraMuscular once PRN anaphylaxis for study drug    Admitted Anthropometrics:    Height (cm): 167.64 (04-12-20 @ 22:07)  Weight (kg): 72.6 (04-12-20 @ 22:07)  BMI (kg/m2): 25.8 (04-12-20 @ 22:07)    Nutrition Focused Physical Exam: Unable to complete due to limited isolation contact precautions at this time.     BMI: 26.1  IBW +/- 10%: 130pounds        Estimated Energy Needs (_25_ kcal/kg- 30___ kcal/kg): 3721-3004  Estimated Protein Needs (0.8__ g/kg- 1.0___ g/kg): 55-69grams  Based on weight of: current weight of 69kg    [  ] Nutrition Diagnosis:  [x  ] No active nutrition diagnosis at this time  [  ] Current medical condition precludes nutrition intervention        Recommendations:  continue Regular diet and ensure enlive x 3 daily.           RD to follow-up per protocol.    Irina Lim MA, RD, CDN   beeper # [phone]

## 2020-04-16 NOTE — PROGRESS NOTE ADULT - SUBJECTIVE AND OBJECTIVE BOX
Patient is a 55y old  Male who presents with a chief complaint of Fever, cough, dyspnoea for 7 days, poor PO, chest pain. (15 Apr 2020 09:55)      SUBJECTIVE / OVERNIGHT EVENTS:  --- Coverage for Dr. Mcpherson ---   sinus on tele  remain tachy 110s  already on Metoprolol 25 mg BID  no cp, no sob, no n/v/d. no abdominal pain.  no headache, no dizziness.   O2 status improving.       Vital Signs Last 24 Hrs  T(C): 37.2 (15 Apr 2020 10:16), Max: 37.5 (15 Apr 2020 05:13)  T(F): 98.9 (15 Apr 2020 10:16), Max: 99.5 (15 Apr 2020 05:13)  HR: 118 (15 Apr 2020 10:16) (103 - 118)  BP: 141/92 (15 Apr 2020 10:16) (119/80 - 141/94)  BP(mean): --  RR: 19 (15 Apr 2020 10:16) (18 - 19)  SpO2: 99% (15 Apr 2020 10:16) (95% - 100%)  I&O's Summary    14 Apr 2020 07:01  -  15 Apr 2020 07:00  --------------------------------------------------------  IN: 360 mL / OUT: 1000 mL / NET: -640 mL    15 Apr 2020 07:01  -  15 Apr 2020 11:12  --------------------------------------------------------  IN: 0 mL / OUT: 400 mL / NET: -400 mL        PHYSICAL EXAM:  GENERAL: NAD, Comfortable, on NC 1L  HEAD:  Atraumatic, Normocephalic  EYES: EOMI, PERRLA, conjunctiva and sclera clear  NECK: Supple, No JVD  CHEST/LUNG: mild decrease breath sounds bilaterally; No wheeze   HEART: mild  tachy, normal rhythm; No murmurs, rubs, or gallops  ABDOMEN: Soft, Nontender, Nondistended; Bowel sounds present  Neuro: AAO x 3, no focal deficit, 5/5 b/l extremities  EXTREMITIES:  2+ Peripheral Pulses, No clubbing, cyanosis, or edema  SKIN: No rashes or lesions    LABS:                        12.5   7.48  )-----------( 319      ( 15 Apr 2020 07:19 )             39.5     04-15    134<L>  |  99  |  15  ----------------------------<  95  4.2   |  20<L>  |  0.55    Ca    8.5      15 Apr 2020 07:18  Phos  4.4     04-14  Mg     2.3     04-14    TPro  6.8  /  Alb  2.9<L>  /  TBili  0.4  /  DBili  x   /  AST  63<H>  /  ALT  76<H>  /  AlkPhos  134<H>  04-15      CAPILLARY BLOOD GLUCOSE                RADIOLOGY & ADDITIONAL TESTS:    Imaging Personally Reviewed:  [x] YES  [ ] NO    Consultant(s) Notes Reviewed:  [x] YES  [ ] NO      MEDICATIONS  (STANDING):  enoxaparin Injectable 40 milliGRAM(s) SubCutaneous two times a day  metoprolol tartrate 25 milliGRAM(s) Oral two times a day  polyethylene glycol 3350 17 Gram(s) Oral every 24 hours  remdesivir IVPB ([id number]) 100 milliGRAM(s) IV Intermittent daily  senna 2 Tablet(s) Oral at bedtime    MEDICATIONS  (PRN):  acetaminophen   Tablet .. 650 milliGRAM(s) Oral every 6 hours PRN Temp greater or equal to 38C (100.4F), Mild Pain (1 - 3)  diphenhydrAMINE   Injectable 50 milliGRAM(s) IV Push once PRN anaphylaxis to study drug  EPINEPHrine     1 mG/mL Injectable 0.3 milliGRAM(s) IntraMuscular once PRN anaphylaxis for study drug  metoprolol tartrate 12.5 milliGRAM(s) Oral every 6 hours PRN HR>120      Care Discussed with Consultants/Other Providers [x] YES  [ ] NO    HEALTH ISSUES - PROBLEM Dx:  Discharge planning issues: Discharge planning issues  Need for prophylactic measure: Need for prophylactic measure  Hyperglycemia: Hyperglycemia  Transaminitis: Transaminitis  Hypokalemia: Hypokalemia  Psoriasis: Psoriasis  Suspected 2019 novel coronavirus infection: Suspected 2019 novel coronavirus infection Patient is a 55y old  Male who presents with a chief complaint of Fever, cough, dyspnoea for 7 days, poor PO, chest pain. (15 Apr 2020 09:55)      SUBJECTIVE / OVERNIGHT EVENTS:  --- Coverage for Dr. Mcpherson ---   sinus on tele  remain tachy 110s  already on Metoprolol 25 mg BID  no cp, no sob, no n/v/d. no abdominal pain.  no headache, no dizziness.   O2 status improving.       Vital Signs Last 24 Hrs  T(C): 36.7 (16 Apr 2020 10:15), Max: 37.2 (16 Apr 2020 04:38)  T(F): 98 (16 Apr 2020 10:15), Max: 99 (16 Apr 2020 04:38)  HR: 124 (16 Apr 2020 10:15) (101 - 124)  BP: 133/78 (16 Apr 2020 10:15) (131/83 - 156/110)  BP(mean): --  RR: 19 (16 Apr 2020 10:15) (19 - 20)  SpO2: 98% (16 Apr 2020 10:15) (97% - 98%)        PHYSICAL EXAM:  GENERAL: NAD, Comfortable, on NC 1L  HEAD:  Atraumatic, Normocephalic  EYES: EOMI, PERRLA, conjunctiva and sclera clear  NECK: Supple, No JVD  CHEST/LUNG: mild decrease breath sounds bilaterally; No wheeze   HEART: mild  tachy, normal rhythm; No murmurs, rubs, or gallops  ABDOMEN: Soft, Nontender, Nondistended; Bowel sounds present  Neuro: AAO x 3, no focal deficit, 5/5 b/l extremities  EXTREMITIES:  2+ Peripheral Pulses, No clubbing, cyanosis, or edema  SKIN: No rashes or lesions    LABS:                        12.5   7.48  )-----------( 319      ( 15 Apr 2020 07:19 )             39.5     04-15    134<L>  |  99  |  15  ----------------------------<  95  4.2   |  20<L>  |  0.55    Ca    8.5      15 Apr 2020 07:18  Phos  4.4     04-14  Mg     2.3     04-14    TPro  6.8  /  Alb  2.9<L>  /  TBili  0.4  /  DBili  x   /  AST  63<H>  /  ALT  76<H>  /  AlkPhos  134<H>  04-15      CAPILLARY BLOOD GLUCOSE                RADIOLOGY & ADDITIONAL TESTS:    Imaging Personally Reviewed:  [x] YES  [ ] NO    Consultant(s) Notes Reviewed:  [x] YES  [ ] NO      MEDICATIONS  (STANDING):  enoxaparin Injectable 40 milliGRAM(s) SubCutaneous two times a day  metoprolol tartrate 25 milliGRAM(s) Oral two times a day  polyethylene glycol 3350 17 Gram(s) Oral every 24 hours  remdesivir IVPB ([id number]) 100 milliGRAM(s) IV Intermittent daily  senna 2 Tablet(s) Oral at bedtime    MEDICATIONS  (PRN):  acetaminophen   Tablet .. 650 milliGRAM(s) Oral every 6 hours PRN Temp greater or equal to 38C (100.4F), Mild Pain (1 - 3)  diphenhydrAMINE   Injectable 50 milliGRAM(s) IV Push once PRN anaphylaxis to study drug  EPINEPHrine     1 mG/mL Injectable 0.3 milliGRAM(s) IntraMuscular once PRN anaphylaxis for study drug  metoprolol tartrate 12.5 milliGRAM(s) Oral every 6 hours PRN HR>120      Care Discussed with Consultants/Other Providers [x] YES  [ ] NO    HEALTH ISSUES - PROBLEM Dx:  Discharge planning issues: Discharge planning issues  Need for prophylactic measure: Need for prophylactic measure  Hyperglycemia: Hyperglycemia  Transaminitis: Transaminitis  Hypokalemia: Hypokalemia  Psoriasis: Psoriasis  Suspected 2019 novel coronavirus infection: Suspected 2019 novel coronavirus infection

## 2020-04-16 NOTE — PROGRESS NOTE ADULT - NSREFPHYEXREFTO_GEN_ALL_CORE
continues to wait to hear from Ms Koffi Rob of the Appeal Dept/[employer]. SW has left two phone messages for Ms Trell Adrian. ANG called and spoke with patient's spouse, Christiano Bell. She has not received word from [employer] or [employer] regarding the appeal she filed. She said her discharge plan is to keep appealing with the hope of  keeping patient on TCU until 7/6, since she has that date off from her job and will have assistance in bringing patient home then. Mrs Marshal Esteban said she has arranged for enough help in the home and patient will have 24 hour supervision/care. ANG called [employer] and was told that patient's case is with a medical reviewer and the results of that review have not yet been determined. [employer] will call the [employer] at [address] [place] [address] with the results of review once completed. ANG has notified Vy Young, Unit Manager, of the above.
Inpatient Physical Exam
Other

## 2020-04-16 NOTE — PROGRESS NOTE ADULT - ASSESSMENT
54 y/o M with a history of plaque psoriasis NOT on any systemic treatment or immunosuppressants for his psoriasis (on reported over the counter Rx but is not sure of Rx) with patient self referring to [facility] following 7 days of persistent intermittent fever, dry cough, progressive dyspnoea with poor PO intake and intermittent chest pain.  Patient with recent loss of his brother from COVID-19 and patient's spouse works at [employer].    Problem/Plan - 1:  ·  Problem: acute hypoxic resp failure sec to 2019 novel coronavirus infection.  Plan: now on trial with Remdesivir   monitor for now, overall improving.   c/w full dose Lovenox to bid   monitor inflammatory markers  CRP and Ferritin downtrending  on Day4 Remdesivir     Problem/Plan - 2:  ·  Problem: Psoriasis.  Plan: Not currently or previously on any disease modifying agents.   stable, not in flare    Problem/Plan - 3:  ·  Problem: Hypokalemia.  Plan: improved     Problem/Plan - 4:  ·  Problem: Transaminitis.  Plan: monitor     Problem/Plan - 5:  ·  Problem: Hyperglycemia.  Plan: Will check HgbA1C (ordered again)     Problem/Plan - 6: A.fib with RVR, now back in sinus  Problem: s/p digoxin loaded on admission, dig level not toxic  converted to Sinus on tele.   remain tachy 110s.   c/wMetoprolol 25 mg BID, treat underlying viral infection  d/c digoxin per card, f/u appreciated.     full code

## 2020-04-16 NOTE — PROGRESS NOTE ADULT - SUBJECTIVE AND OBJECTIVE BOX
Patient is a 55y old  Male who presents with a chief complaint of Fever, cough, dyspnoea for 7 days, poor PO, chest pain. (15 Apr 2020 14:45)    Being followed by ID for        Interval history:  pt is sad- he believes his mother  yesterday  His brother  two weeks ago  physically feeling improved  No other acute events      PAST MEDICAL & SURGICAL HISTORY:  Psoriasis  S/P hemorrhoidectomy    Allergies    No Known Allergies    Intolerances      Antimicrobials:      MEDICATIONS  (STANDING):  enoxaparin Injectable 40 milliGRAM(s) SubCutaneous two times a day  metoprolol tartrate 25 milliGRAM(s) Oral two times a day  polyethylene glycol 3350 17 Gram(s) Oral every 24 hours  remdesivir IVPB ([id number]) 100 milliGRAM(s) IV Intermittent daily  senna 2 Tablet(s) Oral at bedtime      Vital Signs Last 24 Hrs  T(C): 36.7 (04-16-20 @ 10:15), Max: 37.2 (-16-20 @ 04:38)  T(F): 98 (-16-20 @ 10:15), Max: 99 (04-16-20 @ 04:38)  HR: 124 (-16-20 @ 10:15) (101 - 124)  BP: 133/78 (-16-20 @ 10:15) (131/83 - 156/110)  BP(mean): --  RR: 19 (04-16-20 @ 10:15) (19 - 20)  SpO2: 98% (-16-20 @ 10:15) (97% - 98%)  O2 sat is 97 % on one liter  Physical Exam:    Constitutional well preserved,comfortable,pleasant    HEENT PERRLA EOMI,No pallor or icterus    No oral exudate or erythema    Neck supple no JVD or LN    Chest Good AE,CTA    CVS RRR S1 S2 WNl     Abd soft BS normal No tenderness     Ext No cyanosis clubbing or edema    IV site no erythema tenderness or discharge    Joints arthritic changes stable  stable psoriatic changes    CNS AAO X 3 no focal    Lab Data:                          12.5   7.48  )-----------( 319      ( 15 Apr 2020 07:19 )             39.5       04-16    134<L>  |  100  |  15  ----------------------------<  105<H>  4.2   |  21<L>  |  0.59    Ca    8.8      2020 06:15    TPro  7.0  /  Alb  3.1<L>  /  TBili  0.4  /  DBili  x   /  AST  59<H>  /  ALT  71<H>  /  AlkPhos  141<H>  -16          .Blood Blood-Venous  20   No growth to date.  --  --      Procalcitonin, Serum (20 @ 22:54)    Procalcitonin, Serum: 13.59: This assay is intended for use to determine the change of PCT over time  as an aid in assessing the cumulative 28-day risk of all-cause mortality  for patients diagnosed with severe sepsis or septic shock in the ICU, or  when obtained in the emergency department or other medical wards prior to  ICU admission. This assay was performed by the [employer] PCT  assay. ng/mL                  WBC Count: 7.48 (04-15-20 @ 07:19)  WBC Count: 8.44 (20 @ 06:40)  WBC Count: 9.36 (20 @ 12:25)  WBC Count: 11.93 (20 @ 22:54)

## 2020-04-17 LAB
ALBUMIN SERPL ELPH-MCNC: 3 G/DL — LOW (ref 3.3–5)
ALP SERPL-CCNC: 126 U/L — HIGH (ref 40–120)
ALT FLD-CCNC: 70 U/L — HIGH (ref 10–45)
ANION GAP SERPL CALC-SCNC: 13 MMOL/L — SIGNIFICANT CHANGE UP (ref 5–17)
AST SERPL-CCNC: 47 U/L — HIGH (ref 10–40)
BASOPHILS # BLD AUTO: 0.03 K/UL — SIGNIFICANT CHANGE UP (ref 0–0.2)
BASOPHILS NFR BLD AUTO: 0.4 % — SIGNIFICANT CHANGE UP (ref 0–2)
BILIRUB SERPL-MCNC: 0.4 MG/DL — SIGNIFICANT CHANGE UP (ref 0.2–1.2)
BUN SERPL-MCNC: 15 MG/DL — SIGNIFICANT CHANGE UP (ref 7–23)
CALCIUM SERPL-MCNC: 9 MG/DL — SIGNIFICANT CHANGE UP (ref 8.4–10.5)
CHLORIDE SERPL-SCNC: 99 MMOL/L — SIGNIFICANT CHANGE UP (ref 96–108)
CO2 SERPL-SCNC: 22 MMOL/L — SIGNIFICANT CHANGE UP (ref 22–31)
CREAT SERPL-MCNC: 0.58 MG/DL — SIGNIFICANT CHANGE UP (ref 0.5–1.3)
EOSINOPHIL # BLD AUTO: 0.11 K/UL — SIGNIFICANT CHANGE UP (ref 0–0.5)
EOSINOPHIL NFR BLD AUTO: 1.3 % — SIGNIFICANT CHANGE UP (ref 0–6)
GLUCOSE SERPL-MCNC: 101 MG/DL — HIGH (ref 70–99)
HCT VFR BLD CALC: 39.5 % — SIGNIFICANT CHANGE UP (ref 39–50)
HGB BLD-MCNC: 13.2 G/DL — SIGNIFICANT CHANGE UP (ref 13–17)
IMM GRANULOCYTES NFR BLD AUTO: 2.8 % — HIGH (ref 0–1.5)
LYMPHOCYTES # BLD AUTO: 1.56 K/UL — SIGNIFICANT CHANGE UP (ref 1–3.3)
LYMPHOCYTES # BLD AUTO: 18.8 % — SIGNIFICANT CHANGE UP (ref 13–44)
MCHC RBC-ENTMCNC: 26.8 PG — LOW (ref 27–34)
MCHC RBC-ENTMCNC: 33.4 GM/DL — SIGNIFICANT CHANGE UP (ref 32–36)
MCV RBC AUTO: 80.3 FL — SIGNIFICANT CHANGE UP (ref 80–100)
MONOCYTES # BLD AUTO: 0.82 K/UL — SIGNIFICANT CHANGE UP (ref 0–0.9)
MONOCYTES NFR BLD AUTO: 9.9 % — SIGNIFICANT CHANGE UP (ref 2–14)
NEUTROPHILS # BLD AUTO: 5.55 K/UL — SIGNIFICANT CHANGE UP (ref 1.8–7.4)
NEUTROPHILS NFR BLD AUTO: 66.8 % — SIGNIFICANT CHANGE UP (ref 43–77)
NRBC # BLD: 0 /100 WBCS — SIGNIFICANT CHANGE UP (ref 0–0)
PLATELET # BLD AUTO: 491 K/UL — HIGH (ref 150–400)
POTASSIUM SERPL-MCNC: 4.4 MMOL/L — SIGNIFICANT CHANGE UP (ref 3.5–5.3)
POTASSIUM SERPL-SCNC: 4.4 MMOL/L — SIGNIFICANT CHANGE UP (ref 3.5–5.3)
PROT SERPL-MCNC: 7 G/DL — SIGNIFICANT CHANGE UP (ref 6–8.3)
RBC # BLD: 4.92 M/UL — SIGNIFICANT CHANGE UP (ref 4.2–5.8)
RBC # FLD: 13.5 % — SIGNIFICANT CHANGE UP (ref 10.3–14.5)
SODIUM SERPL-SCNC: 134 MMOL/L — LOW (ref 135–145)
WBC # BLD: 8.3 K/UL — SIGNIFICANT CHANGE UP (ref 3.8–10.5)
WBC # FLD AUTO: 8.3 K/UL — SIGNIFICANT CHANGE UP (ref 3.8–10.5)

## 2020-04-17 PROCEDURE — 99231 SBSQ HOSP IP/OBS SF/LOW 25: CPT

## 2020-04-17 RX ORDER — ALPRAZOLAM 0.25 MG
0.25 TABLET ORAL EVERY 6 HOURS
Refills: 0 | Status: DISCONTINUED | OUTPATIENT
Start: 2020-04-17 | End: 2020-04-19

## 2020-04-17 RX ADMIN — Medication 25 MILLIGRAM(S): at 18:48

## 2020-04-17 RX ADMIN — REMDESIVIR 500 MILLIGRAM(S): 5 INJECTION INTRAVENOUS at 11:50

## 2020-04-17 RX ADMIN — ENOXAPARIN SODIUM 40 MILLIGRAM(S): 100 INJECTION SUBCUTANEOUS at 16:51

## 2020-04-17 RX ADMIN — Medication 0.25 MILLIGRAM(S): at 11:46

## 2020-04-17 RX ADMIN — Medication 0.25 MILLIGRAM(S): at 16:51

## 2020-04-17 RX ADMIN — Medication 25 MILLIGRAM(S): at 09:58

## 2020-04-17 RX ADMIN — ENOXAPARIN SODIUM 40 MILLIGRAM(S): 100 INJECTION SUBCUTANEOUS at 05:17

## 2020-04-17 NOTE — PROGRESS NOTE ADULT - SUBJECTIVE AND OBJECTIVE BOX
Patient is a 55y old  Male who presents with a chief complaint of Fever, cough, dyspnoea for 7 days, poor PO, chest pain. (16 Apr 2020 13:22)    Being followed by ID for        Interval history:  pt feels improved  No other acute events      PAST MEDICAL & SURGICAL HISTORY:  Psoriasis  S/P hemorrhoidectomy    Allergies    No Known Allergies    Intolerances      Antimicrobials:      MEDICATIONS  (STANDING):  ALPRAZolam 0.25 milliGRAM(s) Oral every 6 hours  enoxaparin Injectable 40 milliGRAM(s) SubCutaneous two times a day  metoprolol tartrate 25 milliGRAM(s) Oral two times a day  polyethylene glycol 3350 17 Gram(s) Oral every 24 hours  remdesivir IVPB ([id number]) 100 milliGRAM(s) IV Intermittent daily  senna 2 Tablet(s) Oral at bedtime      Vital Signs Last 24 Hrs  T(C): 36.9 (04-17-20 @ 05:11), Max: 37.1 (04-16-20 @ 13:55)  T(F): 98.4 (04-17-20 @ 05:11), Max: 98.8 (04-16-20 @ 13:55)  HR: 112 (04-17-20 @ 11:45) (106 - 121)  BP: 111/76 (04-17-20 @ 11:45) (111/76 - 149/85)  BP(mean): --  RR: 20 (04-17-20 @ 05:11) (20 - 22)  SpO2: 97% (04-17-20 @ 05:11) (96% - 99%)  O2 sat is 97 % on room air    Physical Exam:    Constitutional well preserved,comfortable,pleasant    HEENT PERRLA EOMI,No pallor or icterus    No oral exudate or erythema    Neck supple no JVD or LN    Chest Good AE,CTA    CVS RRR S1 S2 WNl     Abd soft BS normal No tenderness     Ext No cyanosis clubbing or edema    IV site no erythema tenderness or discharge    Joints no swelling or LOM    CNS AAO X 3 no focal    Lab Data:                          13.2   8.30  )-----------( 491      ( 17 Apr 2020 06:46 )             39.5       04-17    134<L>  |  99  |  15  ----------------------------<  101<H>  4.4   |  22  |  0.58    Ca    9.0      17 Apr 2020 06:46    TPro  7.0  /  Alb  3.0<L>  /  TBili  0.4  /  DBili  x   /  AST  47<H>  /  ALT  70<H>  /  AlkPhos  126<H>  04-17      .Blood Blood-Venous  04-13-20   No growth to date.  --  --      WBC Count: 8.30 (04-17-20 @ 06:46)  WBC Count: 7.48 (04-15-20 @ 07:19)  WBC Count: 8.44 (04-14-20 @ 06:40)  WBC Count: 9.36 (04-13-20 @ 12:25)  WBC Count: 11.93 (04-12-20 @ 22:54)

## 2020-04-17 NOTE — PROGRESS NOTE ADULT - ASSESSMENT
Patient is a  54 y/o M--patient followed by his physician in [place], [place]--patient with a history of plaque psoriasis NOT on any systemic treatment or immunosuppressants for his psoriasis (on reported over the counter Rx but is not sure of Rx) with patient self referring to [facility] following 7 days of persistent intermittent fever, dry cough, progressive dyspnoea with poor PO intake and intermittent chest pain.  Patient with recent loss of his brother from COVID-19 and patient's spouse works at [employer]. (13 Apr 2020 00:35)    Pt has agreed to participate in a clinical trial   In order to participate in this clinical trial , the patient should not take concurrent treatment with other agents with actual or possible direct acting antiviral activity against SARS-CoV-2 such a chloroquine or hydroxychloroquine  In addition , the patient should not participate in any other clinical trial of an experimental treatment for Covid-19    please closely monitor markers of inflammation such as ferritin, d- dimer and CRP, seem to be improving    Pt with elevated LFTs, likely from the COVID  will continue to monitor as well as renal function, while on trial drug   relatively stable mumbers  not of clinical significance    continue to monitor O2 requirements  Ordinal scale is 5    Day # 5 clinical trial Patient is a  56 y/o M--patient followed by his physician in [place], [place]--patient with a history of plaque psoriasis NOT on any systemic treatment or immunosuppressants for his psoriasis (on reported over the counter Rx but is not sure of Rx) with patient self referring to [facility] following 7 days of persistent intermittent fever, dry cough, progressive dyspnoea with poor PO intake and intermittent chest pain.  Patient with recent loss of his brother from COVID-19 and patient's spouse works at [employer]. (13 Apr 2020 00:35)    Pt has agreed to participate in a clinical trial   In order to participate in this clinical trial , the patient should not take concurrent treatment with other agents with actual or possible direct acting antiviral activity against SARS-CoV-2 such a chloroquine or hydroxychloroquine  In addition , the patient should not participate in any other clinical trial of an experimental treatment for Covid-19    please closely monitor markers of inflammation such as ferritin, d- dimer and CRP, seem to be improving    Pt with elevated LFTs, likely from the COVID  will continue to monitor as well as renal function, while on trial drug   relatively stable mumbers  not of clinical significance    continue to monitor O2 requirements  Ordinal scale is 5, worse ordinal scale of day was 4    Day # 5 clinical trial

## 2020-04-17 NOTE — PROGRESS NOTE ADULT - ASSESSMENT
56 y/o M with a history of plaque psoriasis NOT on any systemic treatment or immunosuppressants for his psoriasis (on reported over the counter Rx but is not sure of Rx) with patient self referring to [facility] following 7 days of persistent intermittent fever, dry cough, progressive dyspnoea with poor PO intake and intermittent chest pain.  Patient with recent loss of his brother from COVID-19 and patient's spouse works at [employer].    Problem/Plan - 1:  ·  Problem: acute hypoxic resp failure sec to 2019 novel coronavirus infection.  Plan: now on trial with Remdesivir   monitor for now, overall improving.   c/w full dose Lovenox to bid   monitor inflammatory markers  CRP and Ferritin downtrending  on Day4 Remdesivir     Problem/Plan - 2:  ·  Problem: Psoriasis.  Plan: Not currently or previously on any disease modifying agents.   stable, not in flare    Problem/Plan - 3:  ·  Problem: Hypokalemia.  Plan: improved     Problem/Plan - 4:  ·  Problem: Transaminitis.  Plan: monitor     Problem/Plan - 5:  ·  Problem: Hyperglycemia.  Plan: Will check HgbA1C (ordered again)     Problem/Plan - 6: A.fib with RVR, now back in sinus  Problem: s/p digoxin loaded on admission, dig level not toxic  converted to Sinus on tele.   remain tachy 110s.   c/wMetoprolol 25 mg BID, treat underlying viral infection  d/c digoxin per card, f/u appreciated.     full code

## 2020-04-17 NOTE — PROGRESS NOTE ADULT - SUBJECTIVE AND OBJECTIVE BOX
Patient is a 55y old  Male who presents with a chief complaint of Fever, cough, dyspnoea for 7 days, poor PO, chest pain. (17 Apr 2020 12:19)                                                               INTERVAL HPI/OVERNIGHT EVENTS:    REVIEW OF SYSTEMS:     CONSTITUTIONAL: No weakness, fevers or chills  EYES/ENT: No visual changes , no ear ache   NECK: No pain or stiffness  RESPIRATORY: No cough, wheezing,  No shortness of breath  CARDIOVASCULAR: No chest pain or palpitations  GASTROINTESTINAL: No abdominal pain  . No nausea, vomiting, or hematemesis; No diarrhea or constipation. No melena or hematochezia.  GENITOURINARY: No dysuria, frequency or hematuria  NEUROLOGICAL: No numbness or weakness  SKIN: No itching, burning, rashes, or lesions                                                                                                                                                                                                                                                                                 Medications:  MEDICATIONS  (STANDING):  ALPRAZolam 0.25 milliGRAM(s) Oral every 6 hours  enoxaparin Injectable 40 milliGRAM(s) SubCutaneous two times a day  metoprolol tartrate 25 milliGRAM(s) Oral two times a day  polyethylene glycol 3350 17 Gram(s) Oral every 24 hours  remdesivir IVPB ([id number]) 100 milliGRAM(s) IV Intermittent daily  senna 2 Tablet(s) Oral at bedtime    MEDICATIONS  (PRN):  acetaminophen   Tablet .. 650 milliGRAM(s) Oral every 6 hours PRN Temp greater or equal to 38C (100.4F), Mild Pain (1 - 3)  bisacodyl 5 milliGRAM(s) Oral every 12 hours PRN Constipation  diphenhydrAMINE   Injectable 50 milliGRAM(s) IV Push once PRN anaphylaxis to study drug  EPINEPHrine     1 mG/mL Injectable 0.3 milliGRAM(s) IntraMuscular once PRN anaphylaxis for study drug       Allergies    No Known Allergies    Intolerances      Vital Signs Last 24 Hrs  T(C): 36.6 (17 Apr 2020 12:22), Max: 37.1 (16 Apr 2020 13:55)  T(F): 97.8 (17 Apr 2020 12:22), Max: 98.8 (16 Apr 2020 13:55)  HR: 104 (17 Apr 2020 12:22) (104 - 121)  BP: 120/83 (17 Apr 2020 12:22) (111/76 - 149/85)  BP(mean): --  RR: 20 (17 Apr 2020 12:22) (20 - 22)  SpO2: 96% (17 Apr 2020 12:22) (96% - 99%)  CAPILLARY BLOOD GLUCOSE          04-16 @ 07:01  -  04-17 @ 07:00  --------------------------------------------------------  IN: 880 mL / OUT: 750 mL / NET: 130 mL    04-17 @ 07:01  -  04-17 @ 13:03  --------------------------------------------------------  IN: 180 mL / OUT: 0 mL / NET: 180 mL      Physical Exam:    Daily     Daily   General:  Well appearing, NAD, not cachetic  HEENT:  Nonicteric, PERRLA  CV:  RRR, S1S2   Lungs:  CTA B/L, no wheezes, rales, rhonchi  Abdomen:  Soft, non-tender, no distended, positive BS  Extremities:  2+ pulses, no c/c, no edema  Skin:  Warm and dry, no rashes  :  No henderson  Neuro:  AAOx3, non-focal, grossly intact                                                                                                                                                                                                                                                                                                LABS:                               13.2   8.30  )-----------( 491      ( 17 Apr 2020 06:46 )             39.5                      04-17    134<L>  |  99  |  15  ----------------------------<  101<H>  4.4   |  22  |  0.58    Ca    9.0      17 Apr 2020 06:46    TPro  7.0  /  Alb  3.0<L>  /  TBili  0.4  /  DBili  x   /  AST  47<H>  /  ALT  70<H>  /  AlkPhos  126<H>  04-17                       RADIOLOGY & ADDITIONAL TESTS         I personally reviewed: [  ]EKG   [  ]CXR    [  ] CT      A/P:         Discussed with :     Mike consultants' Notes   Time spent :

## 2020-04-17 NOTE — PROVIDER CONTACT NOTE (OTHER) - ACTION/TREATMENT ORDERED:
As per NP give 25mg PO metoprolol early that is scheduled for 2100. Metoprolol administered early
EKG done. Will evaluate pt. Con't to monitor. Con't present tx.

## 2020-04-18 LAB
CULTURE RESULTS: SIGNIFICANT CHANGE UP
CULTURE RESULTS: SIGNIFICANT CHANGE UP
SPECIMEN SOURCE: SIGNIFICANT CHANGE UP
SPECIMEN SOURCE: SIGNIFICANT CHANGE UP

## 2020-04-18 PROCEDURE — 99231 SBSQ HOSP IP/OBS SF/LOW 25: CPT

## 2020-04-18 RX ADMIN — ENOXAPARIN SODIUM 40 MILLIGRAM(S): 100 INJECTION SUBCUTANEOUS at 05:15

## 2020-04-18 RX ADMIN — Medication 25 MILLIGRAM(S): at 16:54

## 2020-04-18 RX ADMIN — ENOXAPARIN SODIUM 40 MILLIGRAM(S): 100 INJECTION SUBCUTANEOUS at 16:54

## 2020-04-18 RX ADMIN — Medication 0.25 MILLIGRAM(S): at 16:54

## 2020-04-18 RX ADMIN — Medication 0.25 MILLIGRAM(S): at 05:15

## 2020-04-18 RX ADMIN — Medication 0.25 MILLIGRAM(S): at 23:09

## 2020-04-18 RX ADMIN — REMDESIVIR 500 MILLIGRAM(S): 5 INJECTION INTRAVENOUS at 10:50

## 2020-04-18 RX ADMIN — Medication 25 MILLIGRAM(S): at 05:16

## 2020-04-18 RX ADMIN — Medication 0.25 MILLIGRAM(S): at 00:20

## 2020-04-18 RX ADMIN — Medication 0.25 MILLIGRAM(S): at 12:35

## 2020-04-18 NOTE — CHART NOTE - NSCHARTNOTEFT_GEN_A_CORE
Spoke with Dr. Leahy pt stable for discharge home tomorrow after one more dose Remdesivir  .MD Mcpherson aware . Pt stable at this time Temp 97.9  RR 16 B/P 106/70 O2 sats 96% RA .       Cindy Coy [facility]   Medicine Team   [phone]

## 2020-04-18 NOTE — PROGRESS NOTE ADULT - SUBJECTIVE AND OBJECTIVE BOX
Patient is a 55y old  Male who presents with a chief complaint of Fever, cough, dyspnoea for 7 days, poor PO, chest pain. (18 Apr 2020 12:19)                                                               INTERVAL HPI/OVERNIGHT EVENTS:    REVIEW OF SYSTEMS:     CONSTITUTIONAL: No weakness, fevers or chills  EYES/ENT: No visual changes , no ear ache   NECK: No pain or stiffness  RESPIRATORY: No cough, wheezing,  No shortness of breath  CARDIOVASCULAR: No chest pain or palpitations  GASTROINTESTINAL: No abdominal pain  . No nausea, vomiting, or hematemesis; No diarrhea or constipation. No melena or hematochezia.  GENITOURINARY: No dysuria, frequency or hematuria  NEUROLOGICAL: No numbness or weakness  SKIN: No itching, burning, rashes, or lesions                                                                                                                                                                                                                                                                                 Medications:  MEDICATIONS  (STANDING):  ALPRAZolam 0.25 milliGRAM(s) Oral every 6 hours  enoxaparin Injectable 40 milliGRAM(s) SubCutaneous two times a day  metoprolol tartrate 25 milliGRAM(s) Oral two times a day  polyethylene glycol 3350 17 Gram(s) Oral every 24 hours  remdesivir IVPB ([id number]) 100 milliGRAM(s) IV Intermittent daily  senna 2 Tablet(s) Oral at bedtime    MEDICATIONS  (PRN):  acetaminophen   Tablet .. 650 milliGRAM(s) Oral every 6 hours PRN Temp greater or equal to 38C (100.4F), Mild Pain (1 - 3)  bisacodyl 5 milliGRAM(s) Oral every 12 hours PRN Constipation  diphenhydrAMINE   Injectable 50 milliGRAM(s) IV Push once PRN anaphylaxis to study drug  EPINEPHrine     1 mG/mL Injectable 0.3 milliGRAM(s) IntraMuscular once PRN anaphylaxis for study drug       Allergies    No Known Allergies    Intolerances      Vital Signs Last 24 Hrs  T(C): 37.1 (18 Apr 2020 21:13), Max: 37.1 (18 Apr 2020 21:13)  T(F): 98.7 (18 Apr 2020 21:13), Max: 98.7 (18 Apr 2020 21:13)  HR: 120 (18 Apr 2020 21:13) (110 - 120)  BP: 121/79 (18 Apr 2020 21:13) (106/70 - 121/79)  BP(mean): --  RR: 18 (18 Apr 2020 21:13) (18 - 20)  SpO2: 96% (18 Apr 2020 21:13) (95% - 96%)  CAPILLARY BLOOD GLUCOSE          04-17 @ 07:01  -  04-18 @ 07:00  --------------------------------------------------------  IN: 740 mL / OUT: 200 mL / NET: 540 mL      Physical Exam:    Daily     Daily   General:  Well appearing, NAD, not cachetic  HEENT:  Nonicteric, PERRLA  CV:  RRR, S1S2   Lungs:  CTA B/L, no wheezes, rales, rhonchi  Abdomen:  Soft, non-tender, no distended, positive BS  Extremities:  2+ pulses, no c/c, no edema  Skin:  Warm and dry, no rashes  :  No henderson  Neuro:  AAOx3, non-focal, grossly intact                                                                                                                                                                                                                                                                                                LABS:                               13.2   8.30  )-----------( 491      ( 17 Apr 2020 06:46 )             39.5                      04-17    134<L>  |  99  |  15  ----------------------------<  101<H>  4.4   |  22  |  0.58    Ca    9.0      17 Apr 2020 06:46    TPro  7.0  /  Alb  3.0<L>  /  TBili  0.4  /  DBili  x   /  AST  47<H>  /  ALT  70<H>  /  AlkPhos  126<H>  04-17                       RADIOLOGY & ADDITIONAL TESTS         I personally reviewed: [  ]EKG   [  ]CXR    [  ] CT      A/P:         Discussed with :     Mike consultants' Notes   Time spent :

## 2020-04-18 NOTE — PROGRESS NOTE ADULT - ASSESSMENT
Patient is a  54 y/o M--patient followed by his physician in [place], [place]--patient with a history of plaque psoriasis NOT on any systemic treatment or immunosuppressants for his psoriasis (on reported over the counter Rx but is not sure of Rx) with patient self referring to [place] following 7 days of persistent intermittent fever, dry cough, progressive dyspnoea with poor PO intake and intermittent chest pain.  Patient with recent loss of his brother from COVID-19 and patient's spouse works at [employer]. (13 Apr 2020 00:35)    Pt has agreed to participate in a clinical trial   In order to participate in this clinical trial , the patient should not take concurrent treatment with other agents with actual or possible direct acting antiviral activity against SARS-CoV-2 such a chloroquine or hydroxychloroquine  In addition , the patient should not participate in any other clinical trial of an experimental treatment for Covid-19    please closely monitor markers of inflammation such as ferritin, d- dimer and CRP, seem to be improving    Pt with elevated LFTs, likely from the COVID  will continue to monitor as well as renal function, while on trial drug   relatively stable mumbers  not of clinical significance  IMproved    continue to monitor O2 requirements  Ordinal scale is 5    Day # 6 clinical trial

## 2020-04-18 NOTE — PROGRESS NOTE ADULT - ASSESSMENT
54 y/o M with a history of plaque psoriasis NOT on any systemic treatment or immunosuppressants for his psoriasis (on reported over the counter Rx but is not sure of Rx) with patient self referring to [place] following 7 days of persistent intermittent fever, dry cough, progressive dyspnoea with poor PO intake and intermittent chest pain.  Patient with recent loss of his brother from COVID-19 and patient's spouse works at [employer].    Problem/Plan - 1:  ·  Problem: acute hypoxic resp failure sec to 2019 novel coronavirus infection.  Plan: now on trial with Remdesivir   monitor for now, overall improving.   c/w full dose Lovenox to bid   monitor inflammatory markers  CRP and Ferritin downtrending  on Day4 Remdesivir     Problem/Plan - 2:  ·  Problem: Psoriasis.  Plan: Not currently or previously on any disease modifying agents.   stable, not in flare    Problem/Plan - 3:  ·  Problem: Hypokalemia.  Plan: improved     Problem/Plan - 4:  ·  Problem: Transaminitis.  Plan: monitor     Problem/Plan - 5:  ·  Problem: Hyperglycemia.  Plan: Will check HgbA1C (ordered again)     Problem/Plan - 6: A.fib with RVR, now back in sinus  Problem: s/p digoxin loaded on admission, dig level not toxic  converted to Sinus on tele.   remain tachy 110s.   c/wMetoprolol 25 mg BID, treat underlying viral infection  d/c digoxin per card, f/u appreciated.     full code

## 2020-04-18 NOTE — PROGRESS NOTE ADULT - SUBJECTIVE AND OBJECTIVE BOX
Patient is a 55y old  Male who presents with a chief complaint of Fever, cough, dyspnoea for 7 days, poor PO, chest pain. (17 Apr 2020 13:03)    Being followed by ID for        Interval history:  pt feels improved  breathing is stable  No cough,SOB,CP  No N/V/D  No abd pain  No urinary complaints  No HA  No joint or limb pain  No other complaints    PAST MEDICAL & SURGICAL HISTORY:  Psoriasis  S/P hemorrhoidectomy    Allergies    No Known Allergies    Intolerances      Antimicrobials:      MEDICATIONS  (STANDING):  ALPRAZolam 0.25 milliGRAM(s) Oral every 6 hours  enoxaparin Injectable 40 milliGRAM(s) SubCutaneous two times a day  metoprolol tartrate 25 milliGRAM(s) Oral two times a day  polyethylene glycol 3350 17 Gram(s) Oral every 24 hours  remdesivir IVPB ([id number]) 100 milliGRAM(s) IV Intermittent daily  senna 2 Tablet(s) Oral at bedtime    MEDICATIONS  (PRN):  acetaminophen   Tablet .. 650 milliGRAM(s) Oral every 6 hours PRN Temp greater or equal to 38C (100.4F), Mild Pain (1 - 3)  bisacodyl 5 milliGRAM(s) Oral every 12 hours PRN Constipation  diphenhydrAMINE   Injectable 50 milliGRAM(s) IV Push once PRN anaphylaxis to study drug  EPINEPHrine     1 mG/mL Injectable 0.3 milliGRAM(s) IntraMuscular once PRN anaphylaxis for study drug      Vital Signs Last 24 Hrs  T(C): 36.6 (04-18-20 @ 11:55), Max: 37.1 (04-17-20 @ 20:47)  T(F): 97.9 (04-18-20 @ 11:55), Max: 98.8 (04-17-20 @ 20:47)  HR: 115 (04-18-20 @ 11:55) (104 - 120)  BP: 116/81 (04-18-20 @ 11:55) (108/76 - 120/83)  BP(mean): --  RR: 18 (04-18-20 @ 11:55) (18 - 20)  SpO2: 96% (04-18-20 @ 11:55) (95% - 97%)  O2 sat is 97% on RA  Physical Exam:    Constitutional well preserved,comfortable,pleasant    HEENT PERRLA EOMI,No pallor or icterus    No oral exudate or erythema    Neck supple no JVD or LN    Chest Good AE,CTA    CVS RRR S1 S2 WNl    Abd soft BS normal No tenderness     Ext No cyanosis clubbing or edema arthritic changes of fingers    multiple psoriatic plaques    IV site no erythema tenderness or discharge    Joints no swelling or LOM    CNS AAO X 3 no focal    Lab Data:                          13.2   8.30  )-----------( 491      ( 17 Apr 2020 06:46 )             39.5       04-17    134<L>  |  99  |  15  ----------------------------<  101<H>  4.4   |  22  |  0.58    Ca    9.0      17 Apr 2020 06:46    TPro  7.0  /  Alb  3.0<L>  /  TBili  0.4  /  DBili  x   /  AST  47<H>  /  ALT  70<H>  /  AlkPhos  126<H>  04-17      .Blood Blood-Venous  04-13-20   No Growth Final  --  --      WBC Count: 8.30 (04-17-20 @ 06:46)  WBC Count: 7.48 (04-15-20 @ 07:19)  WBC Count: 8.44 (04-14-20 @ 06:40)  WBC Count: 9.36 (04-13-20 @ 12:25)  WBC Count: 11.93 (04-12-20 @ 22:54)    D-Dimer Assay, Quantitative: <150 ng/mL DDU (04-15)  D-Dimer Assay, Quantitative: <150 ng/mL DDU (04-14)  D-Dimer Assay, Quantitative: <150 ng/mL DDU (04-12)    Ferritin, Serum: 950 ng/mL (04-15)  Ferritin, Serum: 1839 ng/mL (04-13)          C-Reactive Protein, Serum: 7.62 mg/dL (04-15-20 @ 10:00)  C-Reactive Protein, Serum: 23.06 mg/dL (04-13-20 @ 00:02)      Ordinal Scale: score :   1) Death  2) Hospitalized, on invasive mechanical ventilation or ECMO  3) Hospitalized, on non-invasive ventilation or high flow oxygen devices  4) Hospitalized, requiring low flow(<11l/min) supplemental oxygen  5) Hospitalized, not requiring supplemental oxygen - requiring ongoing medical care(COVID-19 related or otherwise)  6) Hospitalized, not requiring supplemental oxygen - no longer requires ongoing medical care(other than per protocol RDV administration)  7) Not hospitalized

## 2020-04-19 ENCOUNTER — TRANSCRIPTION ENCOUNTER (OUTPATIENT)
Age: 55
End: 2020-04-19

## 2020-04-19 VITALS
DIASTOLIC BLOOD PRESSURE: 79 MMHG | OXYGEN SATURATION: 97 % | SYSTOLIC BLOOD PRESSURE: 131 MMHG | HEART RATE: 118 BPM | RESPIRATION RATE: 20 BRPM | TEMPERATURE: 98 F

## 2020-04-19 LAB
ALBUMIN SERPL ELPH-MCNC: 3.1 G/DL — LOW (ref 3.3–5)
ALP SERPL-CCNC: 102 U/L — SIGNIFICANT CHANGE UP (ref 40–120)
ALT FLD-CCNC: 62 U/L — HIGH (ref 10–45)
ANION GAP SERPL CALC-SCNC: 13 MMOL/L — SIGNIFICANT CHANGE UP (ref 5–17)
AST SERPL-CCNC: 31 U/L — SIGNIFICANT CHANGE UP (ref 10–40)
BASOPHILS # BLD AUTO: 0.04 K/UL — SIGNIFICANT CHANGE UP (ref 0–0.2)
BASOPHILS NFR BLD AUTO: 0.6 % — SIGNIFICANT CHANGE UP (ref 0–2)
BILIRUB SERPL-MCNC: 0.3 MG/DL — SIGNIFICANT CHANGE UP (ref 0.2–1.2)
BUN SERPL-MCNC: 16 MG/DL — SIGNIFICANT CHANGE UP (ref 7–23)
CALCIUM SERPL-MCNC: 9.1 MG/DL — SIGNIFICANT CHANGE UP (ref 8.4–10.5)
CHLORIDE SERPL-SCNC: 100 MMOL/L — SIGNIFICANT CHANGE UP (ref 96–108)
CO2 SERPL-SCNC: 20 MMOL/L — LOW (ref 22–31)
CREAT SERPL-MCNC: 0.61 MG/DL — SIGNIFICANT CHANGE UP (ref 0.5–1.3)
CRP SERPL-MCNC: 2.92 MG/DL — HIGH (ref 0–0.4)
D DIMER BLD IA.RAPID-MCNC: 162 NG/ML DDU — SIGNIFICANT CHANGE UP
EOSINOPHIL # BLD AUTO: 0.07 K/UL — SIGNIFICANT CHANGE UP (ref 0–0.5)
EOSINOPHIL NFR BLD AUTO: 1 % — SIGNIFICANT CHANGE UP (ref 0–6)
FERRITIN SERPL-MCNC: 478 NG/ML — HIGH (ref 30–400)
GLUCOSE SERPL-MCNC: 179 MG/DL — HIGH (ref 70–99)
HCT VFR BLD CALC: 42.5 % — SIGNIFICANT CHANGE UP (ref 39–50)
HGB BLD-MCNC: 13.7 G/DL — SIGNIFICANT CHANGE UP (ref 13–17)
IMM GRANULOCYTES NFR BLD AUTO: 3.8 % — HIGH (ref 0–1.5)
LYMPHOCYTES # BLD AUTO: 1.73 K/UL — SIGNIFICANT CHANGE UP (ref 1–3.3)
LYMPHOCYTES # BLD AUTO: 24.5 % — SIGNIFICANT CHANGE UP (ref 13–44)
MCHC RBC-ENTMCNC: 26.6 PG — LOW (ref 27–34)
MCHC RBC-ENTMCNC: 32.2 GM/DL — SIGNIFICANT CHANGE UP (ref 32–36)
MCV RBC AUTO: 82.5 FL — SIGNIFICANT CHANGE UP (ref 80–100)
MONOCYTES # BLD AUTO: 0.44 K/UL — SIGNIFICANT CHANGE UP (ref 0–0.9)
MONOCYTES NFR BLD AUTO: 6.2 % — SIGNIFICANT CHANGE UP (ref 2–14)
NEUTROPHILS # BLD AUTO: 4.51 K/UL — SIGNIFICANT CHANGE UP (ref 1.8–7.4)
NEUTROPHILS NFR BLD AUTO: 63.9 % — SIGNIFICANT CHANGE UP (ref 43–77)
NRBC # BLD: 0 /100 WBCS — SIGNIFICANT CHANGE UP (ref 0–0)
PLATELET # BLD AUTO: 602 K/UL — HIGH (ref 150–400)
POTASSIUM SERPL-MCNC: 4.3 MMOL/L — SIGNIFICANT CHANGE UP (ref 3.5–5.3)
POTASSIUM SERPL-SCNC: 4.3 MMOL/L — SIGNIFICANT CHANGE UP (ref 3.5–5.3)
PROT SERPL-MCNC: 7.1 G/DL — SIGNIFICANT CHANGE UP (ref 6–8.3)
RBC # BLD: 5.15 M/UL — SIGNIFICANT CHANGE UP (ref 4.2–5.8)
RBC # FLD: 14 % — SIGNIFICANT CHANGE UP (ref 10.3–14.5)
SODIUM SERPL-SCNC: 133 MMOL/L — LOW (ref 135–145)
WBC # BLD: 7.06 K/UL — SIGNIFICANT CHANGE UP (ref 3.8–10.5)
WBC # FLD AUTO: 7.06 K/UL — SIGNIFICANT CHANGE UP (ref 3.8–10.5)

## 2020-04-19 PROCEDURE — 82330 ASSAY OF CALCIUM: CPT

## 2020-04-19 PROCEDURE — 83880 ASSAY OF NATRIURETIC PEPTIDE: CPT

## 2020-04-19 PROCEDURE — 82947 ASSAY GLUCOSE BLOOD QUANT: CPT

## 2020-04-19 PROCEDURE — 87635 SARS-COV-2 COVID-19 AMP PRB: CPT

## 2020-04-19 PROCEDURE — 85610 PROTHROMBIN TIME: CPT

## 2020-04-19 PROCEDURE — 93005 ELECTROCARDIOGRAM TRACING: CPT

## 2020-04-19 PROCEDURE — 84132 ASSAY OF SERUM POTASSIUM: CPT

## 2020-04-19 PROCEDURE — 99285 EMERGENCY DEPT VISIT HI MDM: CPT | Mod: 25

## 2020-04-19 PROCEDURE — 85730 THROMBOPLASTIN TIME PARTIAL: CPT

## 2020-04-19 PROCEDURE — 82435 ASSAY OF BLOOD CHLORIDE: CPT

## 2020-04-19 PROCEDURE — 85014 HEMATOCRIT: CPT

## 2020-04-19 PROCEDURE — 83605 ASSAY OF LACTIC ACID: CPT

## 2020-04-19 PROCEDURE — 84295 ASSAY OF SERUM SODIUM: CPT

## 2020-04-19 PROCEDURE — 87040 BLOOD CULTURE FOR BACTERIA: CPT

## 2020-04-19 PROCEDURE — 99232 SBSQ HOSP IP/OBS MODERATE 35: CPT

## 2020-04-19 PROCEDURE — 80053 COMPREHEN METABOLIC PANEL: CPT

## 2020-04-19 PROCEDURE — 84100 ASSAY OF PHOSPHORUS: CPT

## 2020-04-19 PROCEDURE — 80048 BASIC METABOLIC PNL TOTAL CA: CPT

## 2020-04-19 PROCEDURE — 85379 FIBRIN DEGRADATION QUANT: CPT

## 2020-04-19 PROCEDURE — 80074 ACUTE HEPATITIS PANEL: CPT

## 2020-04-19 PROCEDURE — 82803 BLOOD GASES ANY COMBINATION: CPT

## 2020-04-19 PROCEDURE — 82550 ASSAY OF CK (CPK): CPT

## 2020-04-19 PROCEDURE — 86140 C-REACTIVE PROTEIN: CPT

## 2020-04-19 PROCEDURE — 84145 PROCALCITONIN (PCT): CPT

## 2020-04-19 PROCEDURE — 84484 ASSAY OF TROPONIN QUANT: CPT

## 2020-04-19 PROCEDURE — 85027 COMPLETE CBC AUTOMATED: CPT

## 2020-04-19 PROCEDURE — 83615 LACTATE (LD) (LDH) ENZYME: CPT

## 2020-04-19 PROCEDURE — 82728 ASSAY OF FERRITIN: CPT

## 2020-04-19 PROCEDURE — 83735 ASSAY OF MAGNESIUM: CPT

## 2020-04-19 PROCEDURE — 71045 X-RAY EXAM CHEST 1 VIEW: CPT

## 2020-04-19 PROCEDURE — 80162 ASSAY OF DIGOXIN TOTAL: CPT

## 2020-04-19 PROCEDURE — 83036 HEMOGLOBIN GLYCOSYLATED A1C: CPT

## 2020-04-19 RX ORDER — APIXABAN 2.5 MG/1
1 TABLET, FILM COATED ORAL
Qty: 60 | Refills: 0
Start: 2020-04-19

## 2020-04-19 RX ORDER — METOPROLOL TARTRATE 50 MG
1 TABLET ORAL
Qty: 60 | Refills: 0
Start: 2020-04-19 | End: 2020-05-18

## 2020-04-19 RX ORDER — SENNA PLUS 8.6 MG/1
2 TABLET ORAL
Qty: 0 | Refills: 0 | DISCHARGE
Start: 2020-04-19

## 2020-04-19 RX ORDER — ACETAMINOPHEN 500 MG
2 TABLET ORAL
Qty: 0 | Refills: 0 | DISCHARGE
Start: 2020-04-19

## 2020-04-19 RX ORDER — ALPRAZOLAM 0.25 MG
1 TABLET ORAL
Qty: 20 | Refills: 0
Start: 2020-04-19

## 2020-04-19 RX ORDER — ALPRAZOLAM 0.25 MG
1 TABLET ORAL
Qty: 12 | Refills: 0
Start: 2020-04-19

## 2020-04-19 RX ORDER — POLYETHYLENE GLYCOL 3350 17 G/17G
17 POWDER, FOR SOLUTION ORAL
Qty: 0 | Refills: 0 | DISCHARGE
Start: 2020-04-19

## 2020-04-19 RX ADMIN — ENOXAPARIN SODIUM 40 MILLIGRAM(S): 100 INJECTION SUBCUTANEOUS at 06:57

## 2020-04-19 RX ADMIN — Medication 0.25 MILLIGRAM(S): at 06:57

## 2020-04-19 RX ADMIN — Medication 25 MILLIGRAM(S): at 06:57

## 2020-04-19 RX ADMIN — REMDESIVIR 500 MILLIGRAM(S): 5 INJECTION INTRAVENOUS at 11:00

## 2020-04-19 RX ADMIN — Medication 0.25 MILLIGRAM(S): at 11:50

## 2020-04-19 NOTE — PROGRESS NOTE ADULT - ASSESSMENT
56 y/o M with a history of plaque psoriasis NOT on any systemic treatment or immunosuppressants for his psoriasis (on reported over the counter Rx but is not sure of Rx) with patient self referring to [place] following 7 days of persistent intermittent fever, dry cough, progressive dyspnoea with poor PO intake and intermittent chest pain.  Patient with recent loss of his brother from COVID-19 and patient's spouse works at [employer].    Problem/Plan - 1:  ·  Problem: acute hypoxic resp failure sec to 2019 novel coronavirus infection.  Plan: now on trial with Remdesivir   monitor for now, overall improving.   c/w full dose Lovenox to bid   monitor inflammatory markers  CRP and Ferritin downtrending  on Remdesivir .. d/w DR. Johnson : overall pt doing well clinically and can be dced post dose today     Problem/Plan - 2:  ·  Problem: Psoriasis.  Plan: Not currently or previously on any disease modifying agents.   stable, not in flare    Problem/Plan - 3:  ·  Problem: Hypokalemia.  Plan: improved     Problem/Plan - 4:  ·  Problem: Transaminitis.  Plan: monitor     Problem/Plan - 5:  ·  Problem: Hyperglycemia.  Plan: Will check HgbA1C (ordered again)     Problem/Plan - 6: A.fib with RVR, now back in sinus  Problem: s/p digoxin loaded on admission, dig level not toxic  converted to Sinus on tele.   remain tachy 110s.   c/wMetoprolol 25 mg BID, treat underlying viral infection  d/c digoxin per card, f/u appreciated.     full code

## 2020-04-19 NOTE — PROGRESS NOTE ADULT - NSHPATTENDINGPLANDISCUSS_GEN_ALL_CORE
NP
pt and NP
pt and NP April
Dr Mcpherson
Call Cardiology Fellow or Attending as listed on [email or web address] password: [email or web address].

## 2020-04-19 NOTE — PROGRESS NOTE ADULT - ASSESSMENT
Patient is a  56 y/o M--patient followed by his physician in [place], [place]--patient with a history of plaque psoriasis NOT on any systemic treatment or immunosuppressants for his psoriasis (on reported over the counter Rx but is not sure of Rx) with patient self referring to [place] following 7 days of persistent intermittent fever, dry cough, progressive dyspnoea with poor PO intake and intermittent chest pain.  Patient with recent loss of his brother from COVID-19 and patient's spouse works at [employer]. (13 Apr 2020 00:35)    Pt has agreed to participate in a clinical trial   In order to participate in this clinical trial , the patient should not take concurrent treatment with other agents with actual or possible direct acting antiviral activity against SARS-CoV-2 such a chloroquine or hydroxychloroquine  In addition , the patient should not participate in any other clinical trial of an experimental treatment for Covid-19    please closely monitor markers of inflammation such as ferritin, d- dimer and CRP, seem to be improving        continue to monitor O2 requirements  Ordinal scale is 5    Day # 7 clinical trial  await repeat LFTs    tachycardia stable on metoprolol .Present since admission

## 2020-04-19 NOTE — PROGRESS NOTE ADULT - SUBJECTIVE AND OBJECTIVE BOX
Patient is a 55y old  Male who presents with a chief complaint of Fever, cough, dyspnoea for 7 days, poor PO, chest pain. (19 Apr 2020 11:14)                                                               INTERVAL HPI/OVERNIGHT EVENTS:    REVIEW OF SYSTEMS:     CONSTITUTIONAL: No weakness, fevers or chills  EYES/ENT: No visual changes , no ear ache   NECK: No pain or stiffness  RESPIRATORY: No cough, wheezing,  No shortness of breath  CARDIOVASCULAR: No chest pain or palpitations  GASTROINTESTINAL: No abdominal pain  . No nausea, vomiting, or hematemesis; No diarrhea or constipation. No melena or hematochezia.  GENITOURINARY: No dysuria, frequency or hematuria  NEUROLOGICAL: No numbness or weakness  SKIN: No itching, burning, rashes, or lesions                                                                                                                                                                                                                                                                                 Medications:  MEDICATIONS  (STANDING):  ALPRAZolam 0.25 milliGRAM(s) Oral every 6 hours  enoxaparin Injectable 40 milliGRAM(s) SubCutaneous two times a day  metoprolol tartrate 25 milliGRAM(s) Oral two times a day  polyethylene glycol 3350 17 Gram(s) Oral every 24 hours  remdesivir IVPB ([id number]) 100 milliGRAM(s) IV Intermittent daily  senna 2 Tablet(s) Oral at bedtime    MEDICATIONS  (PRN):  acetaminophen   Tablet .. 650 milliGRAM(s) Oral every 6 hours PRN Temp greater or equal to 38C (100.4F), Mild Pain (1 - 3)  bisacodyl 5 milliGRAM(s) Oral every 12 hours PRN Constipation  diphenhydrAMINE   Injectable 50 milliGRAM(s) IV Push once PRN anaphylaxis to study drug  EPINEPHrine     1 mG/mL Injectable 0.3 milliGRAM(s) IntraMuscular once PRN anaphylaxis for study drug       Allergies    No Known Allergies    Intolerances      Vital Signs Last 24 Hrs  T(C): 36.7 (19 Apr 2020 12:15), Max: 36.8 (19 Apr 2020 04:08)  T(F): 98 (19 Apr 2020 12:15), Max: 98.2 (19 Apr 2020 04:08)  HR: 118 (19 Apr 2020 12:15) (72 - 128)  BP: 131/79 (19 Apr 2020 12:15) (106/75 - 131/79)  BP(mean): --  RR: 20 (19 Apr 2020 12:15) (18 - 20)  SpO2: 97% (19 Apr 2020 12:15) (94% - 97%)  CAPILLARY BLOOD GLUCOSE          04-18 @ 07:01  -  04-19 @ 07:00  --------------------------------------------------------  IN: 0 mL / OUT: 300 mL / NET: -300 mL    04-19 @ 07:01 - 04-19 @ 23:51  --------------------------------------------------------  IN: 720 mL / OUT: 0 mL / NET: 720 mL      Physical Exam:    Daily     Daily   General:  Well appearing, NAD, not cachetic  HEENT:  Nonicteric, PERRLA  CV:  RRR, S1S2   Lungs:  CTA B/L, no wheezes, rales, rhonchi  Abdomen:  Soft, non-tender, no distended, positive BS  Extremities:  2+ pulses, no c/c, no edema  Skin:  Warm and dry, no rashes  :  No henderson  Neuro:  AAOx3, non-focal, grossly intact                                                                                                                                                                                                                                                                                                LABS:                               13.7   7.06  )-----------( 602      ( 19 Apr 2020 12:28 )             42.5                      04-19    133<L>  |  100  |  16  ----------------------------<  179<H>  4.3   |  20<L>  |  0.61    Ca    9.1      19 Apr 2020 12:28    TPro  7.1  /  Alb  3.1<L>  /  TBili  0.3  /  DBili  x   /  AST  31  /  ALT  62<H>  /  AlkPhos  102  04-19                       RADIOLOGY & ADDITIONAL TESTS         I personally reviewed: [  ]EKG   [  ]CXR    [  ] CT      A/P:         Discussed with :     Mike consultants' Notes   Time spent :

## 2020-04-19 NOTE — PROGRESS NOTE ADULT - ATTENDING COMMENTS
- Dr. BOLIVAR Story ([facility])  - [phone]
- Dr. BOLIVAR Story ([facility])  - [phone]
Carolyn Leahy M.D. ,   Pager [phone]     after 5PM/ weekends [phone]
Carolyn Leahy M.D. ,   Pager [phone]     after 5PM/ weekends [phone]
Carolyn Leahy M.D. ,   Pager [phone]     after 5PM/ weekends [phone]
Carolyn Leahy M.D. ,   Pager [phone]     after 5PM/ weekends [phone]
Carolyn Leahy M.D. ,   Pager [phone]     after 5PM/ weekends [phone]
55 year old man with COVID-19 pneumonia and onset of atrial fibrillation with rapid ventricular rate. Received IV metoprolol with limited response, then digoxin load and subsequent reverted to sinus rhythm. Conversion strip reviewed, simply terminated, no pause as sinus rhythm resumed and maintained. Sinus rhythm continues maintained another 24 hours.
Carolyn Leahy M.D. ,   Pager [phone]     after 5PM/ weekends [phone]

## 2020-04-19 NOTE — DISCHARGE NOTE NURSING/CASE MANAGEMENT/SOCIAL WORK - PATIENT PORTAL LINK FT
You can access the FollowMyHealth Patient Portal offered by [facility] by registering at the following website: [email or web address]. By joining [facility]’s FollowMyHealth portal, you will also be able to view your health information using other applications (apps) compatible with our system.

## 2020-04-19 NOTE — PROGRESS NOTE ADULT - SUBJECTIVE AND OBJECTIVE BOX
Patient is a 55y old  Male who presents with a chief complaint of Fever, cough, dyspnoea for 7 days, poor PO, chest pain. (18 Apr 2020 23:58)    Being followed by ID for        Interval history:  No other acute events      ROS:  No cough,SOB,CP  No N/V/D  No abd pain  No urinary complaints  No HA  No joint or limb pain  No other complaints    PAST MEDICAL & SURGICAL HISTORY:  Psoriasis  S/P hemorrhoidectomy    Allergies    No Known Allergies    Intolerances      Antimicrobials:      MEDICATIONS  (STANDING):  ALPRAZolam 0.25 milliGRAM(s) Oral every 6 hours  enoxaparin Injectable 40 milliGRAM(s) SubCutaneous two times a day  metoprolol tartrate 25 milliGRAM(s) Oral two times a day  polyethylene glycol 3350 17 Gram(s) Oral every 24 hours  remdesivir IVPB ([id number]) 100 milliGRAM(s) IV Intermittent daily  senna 2 Tablet(s) Oral at bedtime    MEDICATIONS  (PRN):  acetaminophen   Tablet .. 650 milliGRAM(s) Oral every 6 hours PRN Temp greater or equal to 38C (100.4F), Mild Pain (1 - 3)  bisacodyl 5 milliGRAM(s) Oral every 12 hours PRN Constipation  diphenhydrAMINE   Injectable 50 milliGRAM(s) IV Push once PRN anaphylaxis to study drug  EPINEPHrine     1 mG/mL Injectable 0.3 milliGRAM(s) IntraMuscular once PRN anaphylaxis for study drug      Vital Signs Last 24 Hrs  T(C): 36.8 (04-19-20 @ 04:08), Max: 37.1 (04-18-20 @ 21:13)  T(F): 98.2 (04-19-20 @ 04:08), Max: 98.7 (04-18-20 @ 21:13)  HR: 100 (04-19-20 @ 06:56) (100 - 120)  BP: 115/80 (04-19-20 @ 06:56) (106/70 - 121/79)  BP(mean): --  RR: 18 (04-19-20 @ 04:08) (18 - 18)  SpO2: 94% (04-19-20 @ 04:08) (94% - 96%)    Physical Exam:    Constitutional well preserved,comfortable,pleasant    HEENT PERRLA EOMI,No pallor or icterus    No oral exudate or erythema    Neck supple no JVD or LN    Chest Good AE,CTA    CVS RRR S1 S2 WNl No murmur or rub or gallop    Abd soft BS normal No tenderness no masses    Ext No cyanosis clubbing or edema    IV site no erythema tenderness or discharge    Joints no swelling or LOM    CNS AAO X 3 no focal    Lab Data:    .Blood Blood-Venous  04-13-20   No Growth Final  --  --      WBC Count: 8.30 (04-17-20 @ 06:46)  WBC Count: 7.48 (04-15-20 @ 07:19)  WBC Count: 8.44 (04-14-20 @ 06:40)  WBC Count: 9.36 (04-13-20 @ 12:25)  WBC Count: 11.93 (04-12-20 @ 22:54)    D-Dimer Assay, Quantitative: <150 ng/mL DDU (04-15)  D-Dimer Assay, Quantitative: <150 ng/mL DDU (04-14)  D-Dimer Assay, Quantitative: <150 ng/mL DDU (04-12)    Ferritin, Serum: 950 ng/mL (04-15)  Ferritin, Serum: 1839 ng/mL (04-13)          C-Reactive Protein, Serum: 7.62 mg/dL (04-15-20 @ 10:00)  C-Reactive Protein, Serum: 23.06 mg/dL (04-13-20 @ 00:02)      Ordinal Scale: score :   1) Death  2) Hospitalized, on invasive mechanical ventilation or ECMO  3) Hospitalized, on non-invasive ventilation or high flow oxygen devices  4) Hospitalized, requiring low flow(<11l/min) supplemental oxygen  5) Hospitalized, not requiring supplemental oxygen - requiring ongoing medical care(COVID-19 related or otherwise)  6) Hospitalized, not requiring supplemental oxygen - no longer requires ongoing medical care(other than per protocol RDV administration)  7) Not hospitalized Patient is a 55y old  Male who presents with a chief complaint of Fever, cough, dyspnoea for 7 days, poor PO, chest pain. (18 Apr 2020 23:58)    Being followed by ID for        Interval history:  pt feeling improved  no chest pain  breathing stable  No other acute events          PAST MEDICAL & SURGICAL HISTORY:  Psoriasis  S/P hemorrhoidectomy    Allergies    No Known Allergies    Intolerances      Antimicrobials:      MEDICATIONS  (STANDING):  ALPRAZolam 0.25 milliGRAM(s) Oral every 6 hours  enoxaparin Injectable 40 milliGRAM(s) SubCutaneous two times a day  metoprolol tartrate 25 milliGRAM(s) Oral two times a day  polyethylene glycol 3350 17 Gram(s) Oral every 24 hours  remdesivir IVPB ([id number]) 100 milliGRAM(s) IV Intermittent daily  senna 2 Tablet(s) Oral at bedtime    MEDICATIONS  (PRN):  acetaminophen   Tablet .. 650 milliGRAM(s) Oral every 6 hours PRN Temp greater or equal to 38C (100.4F), Mild Pain (1 - 3)  bisacodyl 5 milliGRAM(s) Oral every 12 hours PRN Constipation  diphenhydrAMINE   Injectable 50 milliGRAM(s) IV Push once PRN anaphylaxis to study drug  EPINEPHrine     1 mG/mL Injectable 0.3 milliGRAM(s) IntraMuscular once PRN anaphylaxis for study drug      Vital Signs Last 24 Hrs  T(C): 36.8 (04-19-20 @ 04:08), Max: 37.1 (04-18-20 @ 21:13)  T(F): 98.2 (04-19-20 @ 04:08), Max: 98.7 (04-18-20 @ 21:13)  HR: 100 (04-19-20 @ 06:56) (100 - 120)  BP: 115/80 (04-19-20 @ 06:56) (106/70 - 121/79)  BP(mean): --  RR: 18 (04-19-20 @ 04:08) (18 - 18)  SpO2: 94% (04-19-20 @ 04:08) (94% - 96%)    Physical Exam:    Constitutional well preserved,comfortable,pleasant    HEENT PERRLA EOMI,No pallor or icterus    No oral exudate or erythema    Neck supple no JVD or LN    Chest Good AE,CTA    CVS RRR S1 S2 WNl     Abd soft BS normal No tenderness     Ext No cyanosis clubbing or edema    IV site no erythema tenderness or discharge    Joints no swelling or LOM    CNS AAO X 3 no focal    Lab Data:    .Blood Blood-Venous  04-13-20   No Growth Final  --  --      WBC Count: 8.30 (04-17-20 @ 06:46)  WBC Count: 7.48 (04-15-20 @ 07:19)  WBC Count: 8.44 (04-14-20 @ 06:40)  WBC Count: 9.36 (04-13-20 @ 12:25)  WBC Count: 11.93 (04-12-20 @ 22:54)    D-Dimer Assay, Quantitative: <150 ng/mL DDU (04-15)  D-Dimer Assay, Quantitative: <150 ng/mL DDU (04-14)  D-Dimer Assay, Quantitative: <150 ng/mL DDU (04-12)    Ferritin, Serum: 950 ng/mL (04-15)  Ferritin, Serum: 1839 ng/mL (04-13)    C-Reactive Protein, Serum: 7.62 mg/dL (04-15-20 @ 10:00)  C-Reactive Protein, Serum: 23.06 mg/dL (04-13-20 @ 00:02)      Ordinal Scale: score : 5  1) Death  2) Hospitalized, on invasive mechanical ventilation or ECMO  3) Hospitalized, on non-invasive ventilation or high flow oxygen devices  4) Hospitalized, requiring low flow(<11l/min) supplemental oxygen  5) Hospitalized, not requiring supplemental oxygen - requiring ongoing medical care(COVID-19 related or otherwise)  6) Hospitalized, not requiring supplemental oxygen - no longer requires ongoing medical care(other than per protocol RDV administration)  7) Not hospitalized

## 2020-04-20 RX ORDER — ALPRAZOLAM 0.25 MG
1 TABLET ORAL
Qty: 12 | Refills: 0
Start: 2020-04-20

## 2020-04-20 RX ORDER — RIVAROXABAN 15 MG-20MG
1 KIT ORAL
Qty: 31 | Refills: 0
Start: 2020-04-20 | End: 2020-05-20

## 2020-04-20 RX ORDER — METOPROLOL TARTRATE 50 MG
1 TABLET ORAL
Qty: 60 | Refills: 0
Start: 2020-04-20 | End: 2020-05-19

## 2020-04-20 RX ORDER — RIVAROXABAN 15 MG-20MG
1 KIT ORAL
Qty: 30 | Refills: 0
Start: 2020-04-20 | End: 2020-05-19

## 2020-04-20 RX ORDER — APIXABAN 2.5 MG/1
1 TABLET, FILM COATED ORAL
Qty: 60 | Refills: 0
Start: 2020-04-20

## 2020-04-20 NOTE — CHART NOTE - NSCHARTNOTEFT_GEN_A_CORE
Asked by RN to send medications to the pharmacy - was not previously transmitted yesterday upon discharge.  Medication reconciliation d/w attending Dr. Mcpherson.    Rica Jimenes NP  [phone] Asked by RN to send medications to the pharmacy - was not previously transmitted yesterday upon discharge.  Medication reconciliation d/w attending Dr. Mcpherson.  Prescription medications sent to the pharmacy.  Xarelto $20 per pharmacist.    Rica Jimenes NP  [phone]

## 2020-05-06 PROBLEM — L40.9 PSORIASIS, UNSPECIFIED: Chronic | Status: ACTIVE | Noted: 2020-04-13

## 2020-05-20 ENCOUNTER — APPOINTMENT (OUTPATIENT)
Dept: CARDIOLOGY | Facility: CLINIC | Age: 55
End: 2020-05-20
Payer: MEDICAID

## 2020-05-20 ENCOUNTER — NON-APPOINTMENT (OUTPATIENT)
Age: 55
End: 2020-05-20

## 2020-05-20 ENCOUNTER — APPOINTMENT (OUTPATIENT)
Dept: CARDIOLOGY | Facility: CLINIC | Age: 55
End: 2020-05-20

## 2020-05-20 VITALS
HEIGHT: 64 IN | OXYGEN SATURATION: 100 % | SYSTOLIC BLOOD PRESSURE: 124 MMHG | DIASTOLIC BLOOD PRESSURE: 81 MMHG | HEART RATE: 94 BPM | WEIGHT: 148 LBS | BODY MASS INDEX: 25.27 KG/M2 | TEMPERATURE: 97.5 F

## 2020-05-20 DIAGNOSIS — L40.0 PSORIASIS VULGARIS: ICD-10-CM

## 2020-05-20 PROCEDURE — 93000 ELECTROCARDIOGRAM COMPLETE: CPT

## 2020-05-20 PROCEDURE — 99214 OFFICE O/P EST MOD 30 MIN: CPT

## 2020-05-20 RX ORDER — METOPROLOL TARTRATE 25 MG/1
25 TABLET, FILM COATED ORAL
Qty: 60 | Refills: 5 | Status: ACTIVE | COMMUNITY
Start: 1900-01-01 | End: 1900-01-01

## 2020-05-20 NOTE — REASON FOR VISIT
[FreeTextEntry1] : This is the first office visit for this 55-year-old Jacqueline is man who COVID-19 and was hospitalized.  During the hospitalization he had a paroxysm of atrial fibrillation with a rapid ventricular response rate.  He is spontaneously reverted to sinus rhythm.  He was discharged home on Xarelto and metoprolol tartrate.  He now comes for follow-up.  Since going out of the hospital he has not had any chest pains, shortness of breath, or palpitations.\par \par The patient never smoked.  He has no alcohol for the past 30 years.  He does have caffeinated tea every day.\par \par His mother  from a viral pneumonia which may have been COVID-19.  His brother  from COVID-19.  His sister is hospitalized now with COVID-19.\par \par He takes metoprolol 25 mg twice a day.  He takes Xarelto 10 mg daily.\par \par He has no known drug allergies.

## 2020-05-20 NOTE — DISCUSSION/SUMMARY
[FreeTextEntry1] : Patient was examined.  His blood pressure was 124/81, and his pulse was 94.  His lungs were clear to auscultation.  Cardiac exam was negative for murmurs rubs or gallops.  His skin showed lesions on plaque psoriasis.  His EKG showed normal sinus rhythm, a short NC interval, and normal QRS complexes.  His medication was renewed.  He will continue his current medication and return in 4 months or earlier if needed.  We will try to obtain an echocardiogram because of his paroxysmal atrial fib.

## 2020-05-20 NOTE — PHYSICAL EXAM
[General Appearance - Well Developed] : well developed [Normal Appearance] : normal appearance [General Appearance - Well Nourished] : well nourished [Well Groomed] : well groomed [General Appearance - In No Acute Distress] : no acute distress [No Deformities] : no deformities [Normal Conjunctiva] : the conjunctiva exhibited no abnormalities [Eyelids - No Xanthelasma] : the eyelids demonstrated no xanthelasmas [Normal Oral Mucosa] : normal oral mucosa [Normal Jugular Venous A Waves Present] : normal jugular venous A waves present [No Oral Pallor] : no oral pallor [No Oral Cyanosis] : no oral cyanosis [Normal Jugular Venous V Waves Present] : normal jugular venous V waves present [No Jugular Venous Nevarez A Waves] : no jugular venous nevarez A waves [Heart Rate And Rhythm] : heart rate and rhythm were normal [Heart Sounds] : normal S1 and S2 [Murmurs] : no murmurs present [Respiration, Rhythm And Depth] : normal respiratory rhythm and effort [Exaggerated Use Of Accessory Muscles For Inspiration] : no accessory muscle use [Auscultation Breath Sounds / Voice Sounds] : lungs were clear to auscultation bilaterally [Abdomen Soft] : soft [Abdomen Tenderness] : non-tender [Abdomen Mass (___ Cm)] : no abdominal mass palpated [Abnormal Walk] : normal gait [Nail Clubbing] : no clubbing of the fingernails [Gait - Sufficient For Exercise Testing] : the gait was sufficient for exercise testing [Cyanosis, Localized] : no localized cyanosis [Petechial Hemorrhages (___cm)] : no petechial hemorrhages [] : no ischemic changes [No Venous Stasis] : no venous stasis [No Skin Ulcers] : no skin ulcer [No Xanthoma] : no  xanthoma was observed [Oriented To Time, Place, And Person] : oriented to person, place, and time [Affect] : the affect was normal [No Anxiety] : not feeling anxious [Mood] : the mood was normal [FreeTextEntry1] : Plaque psoriasis

## 2020-05-24 ENCOUNTER — EMERGENCY (EMERGENCY)
Facility: HOSPITAL | Age: 55
LOS: 1 days | Discharge: HOME | End: 2020-05-24
Attending: EMERGENCY MEDICINE
Payer: MEDICAID

## 2020-05-24 VITALS
DIASTOLIC BLOOD PRESSURE: 90 MMHG | OXYGEN SATURATION: 100 % | RESPIRATION RATE: 18 BRPM | SYSTOLIC BLOOD PRESSURE: 148 MMHG | WEIGHT: 147.05 LBS | HEIGHT: 64 IN | TEMPERATURE: 98 F | HEART RATE: 78 BPM

## 2020-05-24 DIAGNOSIS — Z98.89 OTHER SPECIFIED POSTPROCEDURAL STATES: Chronic | ICD-10-CM

## 2020-05-24 PROCEDURE — 93010 ELECTROCARDIOGRAM REPORT: CPT

## 2020-05-24 PROCEDURE — 99284 EMERGENCY DEPT VISIT MOD MDM: CPT

## 2020-05-25 VITALS
RESPIRATION RATE: 18 BRPM | OXYGEN SATURATION: 100 % | SYSTOLIC BLOOD PRESSURE: 139 MMHG | DIASTOLIC BLOOD PRESSURE: 87 MMHG | HEART RATE: 79 BPM

## 2020-05-25 LAB
ALBUMIN SERPL ELPH-MCNC: 4.3 G/DL — SIGNIFICANT CHANGE UP (ref 3.3–5)
ALP SERPL-CCNC: 90 U/L — SIGNIFICANT CHANGE UP (ref 40–120)
ALT FLD-CCNC: 32 U/L — SIGNIFICANT CHANGE UP (ref 10–45)
ANION GAP SERPL CALC-SCNC: 14 MMOL/L — SIGNIFICANT CHANGE UP (ref 5–17)
APTT BLD: 42.5 SEC — HIGH (ref 27.5–36.3)
AST SERPL-CCNC: 22 U/L — SIGNIFICANT CHANGE UP (ref 10–40)
BASOPHILS # BLD AUTO: 0.05 K/UL — SIGNIFICANT CHANGE UP (ref 0–0.2)
BASOPHILS NFR BLD AUTO: 0.6 % — SIGNIFICANT CHANGE UP (ref 0–2)
BILIRUB SERPL-MCNC: 0.2 MG/DL — SIGNIFICANT CHANGE UP (ref 0.2–1.2)
BUN SERPL-MCNC: 12 MG/DL — SIGNIFICANT CHANGE UP (ref 7–23)
CALCIUM SERPL-MCNC: 9.6 MG/DL — SIGNIFICANT CHANGE UP (ref 8.4–10.5)
CHLORIDE SERPL-SCNC: 103 MMOL/L — SIGNIFICANT CHANGE UP (ref 96–108)
CO2 SERPL-SCNC: 21 MMOL/L — LOW (ref 22–31)
CREAT SERPL-MCNC: 1.06 MG/DL — SIGNIFICANT CHANGE UP (ref 0.5–1.3)
EOSINOPHIL # BLD AUTO: 0.22 K/UL — SIGNIFICANT CHANGE UP (ref 0–0.5)
EOSINOPHIL NFR BLD AUTO: 2.5 % — SIGNIFICANT CHANGE UP (ref 0–6)
GLUCOSE SERPL-MCNC: 154 MG/DL — HIGH (ref 70–99)
HCT VFR BLD CALC: 40.9 % — SIGNIFICANT CHANGE UP (ref 39–50)
HGB BLD-MCNC: 12.9 G/DL — LOW (ref 13–17)
IMM GRANULOCYTES NFR BLD AUTO: 0.6 % — SIGNIFICANT CHANGE UP (ref 0–1.5)
INR BLD: 1.64 RATIO — HIGH (ref 0.88–1.16)
LYMPHOCYTES # BLD AUTO: 2.59 K/UL — SIGNIFICANT CHANGE UP (ref 1–3.3)
LYMPHOCYTES # BLD AUTO: 29.9 % — SIGNIFICANT CHANGE UP (ref 13–44)
MCHC RBC-ENTMCNC: 27.2 PG — SIGNIFICANT CHANGE UP (ref 27–34)
MCHC RBC-ENTMCNC: 31.5 GM/DL — LOW (ref 32–36)
MCV RBC AUTO: 86.1 FL — SIGNIFICANT CHANGE UP (ref 80–100)
MONOCYTES # BLD AUTO: 0.66 K/UL — SIGNIFICANT CHANGE UP (ref 0–0.9)
MONOCYTES NFR BLD AUTO: 7.6 % — SIGNIFICANT CHANGE UP (ref 2–14)
NEUTROPHILS # BLD AUTO: 5.09 K/UL — SIGNIFICANT CHANGE UP (ref 1.8–7.4)
NEUTROPHILS NFR BLD AUTO: 58.8 % — SIGNIFICANT CHANGE UP (ref 43–77)
NRBC # BLD: 0 /100 WBCS — SIGNIFICANT CHANGE UP (ref 0–0)
PLATELET # BLD AUTO: 297 K/UL — SIGNIFICANT CHANGE UP (ref 150–400)
POTASSIUM SERPL-MCNC: 3.9 MMOL/L — SIGNIFICANT CHANGE UP (ref 3.5–5.3)
POTASSIUM SERPL-SCNC: 3.9 MMOL/L — SIGNIFICANT CHANGE UP (ref 3.5–5.3)
PROT SERPL-MCNC: 7.8 G/DL — SIGNIFICANT CHANGE UP (ref 6–8.3)
PROTHROM AB SERPL-ACNC: 19.1 SEC — HIGH (ref 10–12.9)
RBC # BLD: 4.75 M/UL — SIGNIFICANT CHANGE UP (ref 4.2–5.8)
RBC # FLD: 14.9 % — HIGH (ref 10.3–14.5)
SODIUM SERPL-SCNC: 138 MMOL/L — SIGNIFICANT CHANGE UP (ref 135–145)
TROPONIN T, HIGH SENSITIVITY RESULT: <6 NG/L — SIGNIFICANT CHANGE UP (ref 0–51)
WBC # BLD: 8.66 K/UL — SIGNIFICANT CHANGE UP (ref 3.8–10.5)
WBC # FLD AUTO: 8.66 K/UL — SIGNIFICANT CHANGE UP (ref 3.8–10.5)

## 2020-05-25 PROCEDURE — 71045 X-RAY EXAM CHEST 1 VIEW: CPT

## 2020-05-25 PROCEDURE — 70450 CT HEAD/BRAIN W/O DYE: CPT | Mod: 26

## 2020-05-25 PROCEDURE — 99284 EMERGENCY DEPT VISIT MOD MDM: CPT | Mod: 25

## 2020-05-25 PROCEDURE — 93005 ELECTROCARDIOGRAM TRACING: CPT

## 2020-05-25 PROCEDURE — 71045 X-RAY EXAM CHEST 1 VIEW: CPT | Mod: 26

## 2020-05-25 PROCEDURE — 80053 COMPREHEN METABOLIC PANEL: CPT

## 2020-05-25 PROCEDURE — 84484 ASSAY OF TROPONIN QUANT: CPT

## 2020-05-25 PROCEDURE — 85730 THROMBOPLASTIN TIME PARTIAL: CPT

## 2020-05-25 PROCEDURE — 70450 CT HEAD/BRAIN W/O DYE: CPT

## 2020-05-25 PROCEDURE — 85027 COMPLETE CBC AUTOMATED: CPT

## 2020-05-25 PROCEDURE — 85610 PROTHROMBIN TIME: CPT

## 2020-05-25 RX ORDER — ACETAMINOPHEN 500 MG
975 TABLET ORAL ONCE
Refills: 0 | Status: COMPLETED | OUTPATIENT
Start: 2020-05-25 | End: 2020-05-25

## 2020-05-25 RX ADMIN — Medication 975 MILLIGRAM(S): at 02:30

## 2020-05-25 NOTE — ED PROVIDER NOTE - PATIENT PORTAL LINK FT
You can access the FollowMyHealth Patient Portal offered by [facility] by registering at the following website: [email or web address]. By joining [facility]’s FollowMyHealth portal, you will also be able to view your health information using other applications (apps) compatible with our system.

## 2020-05-25 NOTE — ED ADULT NURSE NOTE - OBJECTIVE STATEMENT
55 year old male presents to the ED via walk in with c/o chest pain, and numbness to left side of face beginning 3pm today. Pt tested + for covid in mid-april but has no symptoms at this time. Pt takes xarelto daily for "prophylaxis against clots after covid" as recommended by his PMD. EKG done in triage, placed on cardiac monitor in ED with RSR 70's noted. BEFAST negative. Denies c/o cough, fever, chills, keisha pain or abd pain. Comfort and safety measures maintained.

## 2020-05-25 NOTE — ED PROVIDER NOTE - NSFOLLOWUPINSTRUCTIONS_ED_ALL_ED_FT
ALL YOUR LABS AND YOUR IMAGING WERE ALL NORMAL TODAY    PLEASE FOLLOW UP WITH YOUR PRIMARY CARE DOCTOR FOR CONTINUED EVALUATION AND CARE     RETURN TO THE EMERGENCY ROOM IF YOU DEVELOP NEW NUMBNESS OR WEAKNESS, IF YOU CHEST PAIN WORSENS, IF YOU DEVELOP ANY OTHER NEW OR CONCERNING SYMPTOMS

## 2020-05-25 NOTE — ED PROVIDER NOTE - CLINICAL SUMMARY MEDICAL DECISION MAKING FREE TEXT BOX
Pt p/w chest discomfort and facial tingling, no facial droop or extremity weakness, low susp for intracerebral etiology but pt on AC and w/ hx afib will obtain CT head, pt also w/ complaint of chest discomfort , pt w/ mult fam members recently  due to covid, will obtain cardiac screening labs and head ct and if all WNL anticipate dc home w/ reassurance -Anita

## 2020-05-25 NOTE — ED PROVIDER NOTE - ATTENDING CONTRIBUTION TO CARE
Attending Statement (BRETA Ruff MD):    HPI: 54y/o M with h/o psoriatic arthritis, recent admission for COVID19 infection (c/b development of Afib, now on metoprolol and Xarelto) presenting with sensation of numbness/tingling (described as pins/needles sensation) in the left face since approximately 3PM yesterday; no other areas of sensory changes, no vision changes, no weakness, no n/v, no fever, no chest pain, no SOB.  Per resident note, some L sided chest numbness but patient denying this complaint during my interview.     Review of Systems:  -General: no fever or chills  -ENT: no congestion, no difficulty swallowing  -Pulmonary: no cough, no shortness of breath  -Cardiac: no chest pain, no palpitations  -Gastrointestinal: no abdominal pain, no nausea, no vomiting, and no diarrhea.  -Genitourinary: no blood or pain with urination  -Musculoskeletal: no back or neck pain  -Skin: no rashes  -Endocrine: No h/o diabetes or thyroid disease  -Neurologic: see HPI    PSH/PMH as noted above    On Physical Exam:  General: well appearing, in NAD, speaking clearly in full sentences and without difficulty; cooperative with exam  HEENT: PERRL, MMM  Neck: no neck tenderness, no nuchal rigidity  Cardiac: normal s1, s2; RRR; no MGR  Lungs: CTABL  Abdomen: soft nontender/nondistended  : no bladder tenderness or distension  Skin: intact, no rash  Extremities: no peripheral edema, no gross deformities  Neuro: no gross neurologic deficits; CN II-XII intact; 5/5 str in all extremities; no areas of sensory loss in extremities, normal coordination with upper and lower extremities, normal gait.    MDM: 55M h/o psoriatic arthritis p/w paresthesias in the left face (V1,2,3); however no neurologic deficits and no other findings noted; is well appearing; unlikely to be central neuro process / cva given that sensation not lost; however, out of abundance of caution will obtain CT head, and labs: cbc, cmp; ECG shows nsr without gross abnormalities.  If no acute findings on CT/labs, will be stable for dc. Attending Statement (BERTA Ruff MD):    HPI: 54y/o M with h/o psoriatic arthritis, recent admission for COVID19 infection (c/b development of Afib, now on metoprolol and Xarelto) presenting with sensation of numbness/tingling (described as pins/needles sensation) in the left face since approximately 3PM yesterday; no other areas of sensory changes, no vision changes, no weakness, no n/v, no fever, no chest pain, no SOB.  Per resident note, some L sided chest numbness but patient denying this complaint during my interview.     Review of Systems:  -General: no fever or chills  -ENT: no congestion, no difficulty swallowing  -Pulmonary: no cough, no shortness of breath  -Cardiac: no chest pain, no palpitations  -Gastrointestinal: no abdominal pain, no nausea, no vomiting, and no diarrhea.  -Genitourinary: no blood or pain with urination  -Musculoskeletal: no back or neck pain  -Skin: no rashes  -Endocrine: No h/o diabetes or thyroid disease  -Neurologic: see HPI    PSH/PMH as noted above    On Physical Exam:  General: well appearing, in NAD, speaking clearly in full sentences and without difficulty; cooperative with exam  HEENT: PERRL, MMM  Neck: no neck tenderness, no nuchal rigidity  Cardiac: normal s1, s2; RRR; no MGR  Lungs: CTABL  Abdomen: soft nontender/nondistended  : no bladder tenderness or distension  Skin: intact, no rash  Extremities: no peripheral edema, no gross deformities  Neuro: no gross neurologic deficits; CN II-XII intact (able to sense touch v1-3 b/l); 5/5 str in all extremities; no areas of sensory loss in extremities, normal coordination with upper and lower extremities, normal gait.    MDM: 55M h/o psoriatic arthritis p/w paresthesias in the left face (V1,2,3); however no neurologic deficits and no other findings noted; is well appearing; unlikely to be central neuro process / cva given that sensation not lost; however, out of abundance of caution will obtain CT head, and labs: cbc, cmp; ECG shows nsr without gross abnormalities.  If no acute findings on CT/labs, will be stable for dc.

## 2020-05-25 NOTE — ED PROVIDER NOTE - PHYSICAL EXAMINATION
On Physical Exam:  General: well appearing, in NAD, speaking clearly in full sentences and without difficulty; cooperative with exam  HEENT: PERRL, MMM  Neck: no neck tenderness, no nuchal rigidity  Cardiac: normal s1, s2; RRR; no MGR  Lungs: CTABL  Abdomen: soft nontender/nondistended  : no bladder tenderness or distension  Skin: intact, no rash  Extremities: no peripheral edema, no gross deformities  Neuro: no gross neurologic deficits; CN II-XII intact (able to sense touch v1-3 b/l); 5/5 str in all extremities; no areas of sensory loss in extremities, normal coordination with upper and lower extremities, normal gait.

## 2020-05-25 NOTE — ED PROVIDER NOTE - OBJECTIVE STATEMENT
55y m PMhx chronic arthritis/psoriasis recently admitted to hospital for hypoxic resp. failure 2/2 covid c/b p. afib w/ RVR, dced home on Xarelto w/ metoprolol and on room air, p/w sensation of L facial numbness and L sided chest discomfort. Pt states the facial numbness is over his lips and extends to his cheek. Denies headache or blurry vision or dizziness. Pt states chest discomfort is over L sternum, is nonexertional, intermittent, w/out associated SOB or diaphoresis. Denies palpitations or syncope.

## 2020-06-04 ENCOUNTER — NON-APPOINTMENT (OUTPATIENT)
Age: 55
End: 2020-06-04

## 2020-06-04 ENCOUNTER — APPOINTMENT (OUTPATIENT)
Dept: CARDIOLOGY | Facility: CLINIC | Age: 55
End: 2020-06-04
Payer: COMMERCIAL

## 2020-06-04 VITALS
TEMPERATURE: 97.6 F | SYSTOLIC BLOOD PRESSURE: 112 MMHG | DIASTOLIC BLOOD PRESSURE: 75 MMHG | HEART RATE: 95 BPM | HEIGHT: 64 IN | WEIGHT: 148 LBS | OXYGEN SATURATION: 98 % | BODY MASS INDEX: 25.27 KG/M2

## 2020-06-04 DIAGNOSIS — I51.7 CARDIOMEGALY: ICD-10-CM

## 2020-06-04 DIAGNOSIS — U07.1 COVID-19: ICD-10-CM

## 2020-06-04 PROCEDURE — 99214 OFFICE O/P EST MOD 30 MIN: CPT

## 2020-06-04 PROCEDURE — 93000 ELECTROCARDIOGRAM COMPLETE: CPT

## 2020-06-04 PROCEDURE — 93306 TTE W/DOPPLER COMPLETE: CPT

## 2020-06-04 NOTE — REVIEW OF SYSTEMS
[Negative] : Heme/Lymph [Palpitations] : no palpitations [Chest Pain] : no chest pain [Shortness Of Breath] : no shortness of breath

## 2020-06-04 NOTE — PHYSICAL EXAM
[Well Groomed] : well groomed [General Appearance - Well Developed] : well developed [Normal Appearance] : normal appearance [General Appearance - Well Nourished] : well nourished [General Appearance - In No Acute Distress] : no acute distress [No Deformities] : no deformities [Normal Conjunctiva] : the conjunctiva exhibited no abnormalities [Eyelids - No Xanthelasma] : the eyelids demonstrated no xanthelasmas [Normal Oral Mucosa] : normal oral mucosa [No Oral Pallor] : no oral pallor [No Oral Cyanosis] : no oral cyanosis [Normal Jugular Venous A Waves Present] : normal jugular venous A waves present [Normal Jugular Venous V Waves Present] : normal jugular venous V waves present [No Jugular Venous Nevarez A Waves] : no jugular venous nevarez A waves [Respiration, Rhythm And Depth] : normal respiratory rhythm and effort [Exaggerated Use Of Accessory Muscles For Inspiration] : no accessory muscle use [Auscultation Breath Sounds / Voice Sounds] : lungs were clear to auscultation bilaterally [Heart Rate And Rhythm] : heart rate and rhythm were normal [Heart Sounds] : normal S1 and S2 [Murmurs] : no murmurs present [Abdomen Soft] : soft [Abdomen Tenderness] : non-tender [Abdomen Mass (___ Cm)] : no abdominal mass palpated [Abnormal Walk] : normal gait [Gait - Sufficient For Exercise Testing] : the gait was sufficient for exercise testing [Nail Clubbing] : no clubbing of the fingernails [Cyanosis, Localized] : no localized cyanosis [Petechial Hemorrhages (___cm)] : no petechial hemorrhages [] : no ischemic changes [No Venous Stasis] : no venous stasis [No Skin Ulcers] : no skin ulcer [No Xanthoma] : no  xanthoma was observed [Oriented To Time, Place, And Person] : oriented to person, place, and time [Affect] : the affect was normal [Mood] : the mood was normal [No Anxiety] : not feeling anxious [FreeTextEntry1] : Plaque psoriasis

## 2020-06-04 NOTE — REASON FOR VISIT
[FreeTextEntry1] : This is the 2 nd office visit for this 55 year-old [place] born male  who got  COVID-19 and was hospitalized.  During the hospitalization he had a paroxysm of atrial fibrillation with a rapid ventricular response rate.  He is spontaneously reverted to sinus rhythm.  He was discharged home on Xarelto and metoprolol tartrate.  He now comes for follow-up.  Since going out of the hospital he has not had any chest pains, shortness of breath, or palpitations.\par 2020: On May 25, 2020 the patient got some jaw numbness and went to the [facility] emergency room where they did a CAT scan and kept him for 6 hours and sent him home.  He denies any chest pains.  He had an echocardiogram today which showed left ventricular hypertrophy and diastolic dysfunction but was otherwise normal.  There was no significant valvular disease.  His left ventricular ejection fraction was normal.\par \par The patient never smoked.  He has no alcohol for the past 30 years.  He does have caffeinated tea every day.\par \par His mother  from a viral pneumonia which may have been COVID-19.  His brother  from COVID-19.  His sister is hospitalized now with COVID-19.\par \par He takes metoprolol 25 mg twice a day.  He takes Xarelto 10 mg daily.\par \par He has no known drug allergies.

## 2020-06-04 NOTE — DISCUSSION/SUMMARY
[FreeTextEntry1] : Patient was examined.  His blood pressure was 112/75, and his pulse was 95.  His lungs were clear to auscultation.  Cardiac exam was negative for murmurs rubs or gallops.  His skin showed lesions on plaque psoriasis.  His EKG showed normal sinus rhythm, a short FL interval, and normal QRS complexes.  .  He will continue his current medication and return in 4 months or earlier if needed.

## 2020-09-09 ENCOUNTER — APPOINTMENT (OUTPATIENT)
Dept: CARDIOLOGY | Facility: CLINIC | Age: 55
End: 2020-09-09

## 2021-02-10 ENCOUNTER — NON-APPOINTMENT (OUTPATIENT)
Age: 56
End: 2021-02-10

## 2021-02-10 ENCOUNTER — APPOINTMENT (OUTPATIENT)
Dept: CARDIOLOGY | Facility: CLINIC | Age: 56
End: 2021-02-10
Payer: MEDICAID

## 2021-02-10 VITALS
DIASTOLIC BLOOD PRESSURE: 104 MMHG | TEMPERATURE: 97.8 F | WEIGHT: 160 LBS | OXYGEN SATURATION: 98 % | HEIGHT: 64 IN | BODY MASS INDEX: 27.31 KG/M2 | HEART RATE: 73 BPM | SYSTOLIC BLOOD PRESSURE: 148 MMHG

## 2021-02-10 VITALS — DIASTOLIC BLOOD PRESSURE: 96 MMHG | SYSTOLIC BLOOD PRESSURE: 142 MMHG

## 2021-02-10 DIAGNOSIS — I51.89 OTHER ILL-DEFINED HEART DISEASES: ICD-10-CM

## 2021-02-10 DIAGNOSIS — I48.0 PAROXYSMAL ATRIAL FIBRILLATION: ICD-10-CM

## 2021-02-10 DIAGNOSIS — R07.89 OTHER CHEST PAIN: ICD-10-CM

## 2021-02-10 DIAGNOSIS — I10 ESSENTIAL (PRIMARY) HYPERTENSION: ICD-10-CM

## 2021-02-10 PROCEDURE — 99214 OFFICE O/P EST MOD 30 MIN: CPT

## 2021-02-10 PROCEDURE — 99072 ADDL SUPL MATRL&STAF TM PHE: CPT

## 2021-02-10 PROCEDURE — 93000 ELECTROCARDIOGRAM COMPLETE: CPT

## 2021-02-10 RX ORDER — ASPIRIN ENTERIC COATED TABLETS 81 MG 81 MG/1
81 TABLET, DELAYED RELEASE ORAL
Qty: 100 | Refills: 0 | Status: ACTIVE | COMMUNITY
Start: 2021-02-10

## 2021-02-10 RX ORDER — RIVAROXABAN 15 MG/1
15 TABLET, FILM COATED ORAL
Qty: 30 | Refills: 5 | Status: DISCONTINUED | COMMUNITY
End: 2021-02-10

## 2021-02-10 RX ORDER — AMLODIPINE BESYLATE 5 MG/1
5 TABLET ORAL
Qty: 90 | Refills: 3 | Status: ACTIVE | COMMUNITY
Start: 2021-02-10 | End: 1900-01-01

## 2021-02-10 NOTE — DISCUSSION/SUMMARY
[FreeTextEntry1] : The patient was examined.  His blood pressure was 142/96, and his pulse was 73.  His lungs were clear to auscultation.  Cardiac exam was negative for murmurs rubs or gallops.  His skin showed lesions on plaque psoriasis.  His EKG showed normal sinus rhythm, a short AL interval, and normal QRS complexes.  .  He will continue his current medication and we will add amlodipine 5 mg because of his hypertension.  Because of his chest pains we will send him for a treadmill stress test to rule out ischemic heart disease.,  I spent a total of 39 minutes on the date of the encounter evaluating and treating the patient and ordering the test and getting consent for the stress test.

## 2021-02-10 NOTE — REASON FOR VISIT
[FreeTextEntry1] : This is the 3 rd office visit for this 56 year-old [place] born male  who got  COVID-19 and was hospitalized.  During the hospitalization he had a paroxysm of atrial fibrillation with a rapid ventricular response rate.  He is spontaneously reverted to sinus rhythm.  He was discharged home on Xarelto and metoprolol tartrate.  He now comes for follow-up.  Since going out of the hospital he has not had any chest pains, shortness of breath, or palpitations.\par 2020: On May 25, 2020 the patient got some jaw numbness and went to the [facility] emergency room where they did a CAT scan and kept him for 6 hours and sent him home.  He denies any chest pains.  He had an echocardiogram today which showed left ventricular hypertrophy and diastolic dysfunction but was otherwise normal.  There was no significant valvular disease.  His left ventricular ejection fraction was normal.\par February 10, 2021.  The patient has been getting chest pains that last up to 5 minutes at a time.  They not specifically related to activity.  He denies any shortness of breath or palpitations.  He is taking metoprolol 50 mg\par \par The patient never smoked.  He has no alcohol for the past 30 years.  He does have caffeinated tea every day.\par \par His mother  from a viral pneumonia which may have been COVID-19.  His brother  from COVID-19.  His sister is hospitalized now with COVID-19.\par \par He takes metoprolol 25 mg twice a day.  He takes Xarelto 10 mg daily.\par \par He has no known drug allergies.

## 2021-02-10 NOTE — PHYSICAL EXAM
[General Appearance - Well Developed] : well developed [Normal Appearance] : normal appearance [Well Groomed] : well groomed [General Appearance - Well Nourished] : well nourished [No Deformities] : no deformities [General Appearance - In No Acute Distress] : no acute distress [Normal Conjunctiva] : the conjunctiva exhibited no abnormalities [Eyelids - No Xanthelasma] : the eyelids demonstrated no xanthelasmas [Normal Oral Mucosa] : normal oral mucosa [No Oral Pallor] : no oral pallor [No Oral Cyanosis] : no oral cyanosis [Normal Jugular Venous A Waves Present] : normal jugular venous A waves present [Normal Jugular Venous V Waves Present] : normal jugular venous V waves present [No Jugular Venous Nevarez A Waves] : no jugular venous nevarez A waves [Respiration, Rhythm And Depth] : normal respiratory rhythm and effort [Exaggerated Use Of Accessory Muscles For Inspiration] : no accessory muscle use [Auscultation Breath Sounds / Voice Sounds] : lungs were clear to auscultation bilaterally [Heart Rate And Rhythm] : heart rate and rhythm were normal [Heart Sounds] : normal S1 and S2 [Murmurs] : no murmurs present [Abdomen Soft] : soft [Abdomen Tenderness] : non-tender [Abdomen Mass (___ Cm)] : no abdominal mass palpated [Abnormal Walk] : normal gait [Gait - Sufficient For Exercise Testing] : the gait was sufficient for exercise testing [Nail Clubbing] : no clubbing of the fingernails [Cyanosis, Localized] : no localized cyanosis [Petechial Hemorrhages (___cm)] : no petechial hemorrhages [] : no ischemic changes [No Venous Stasis] : no venous stasis [No Skin Ulcers] : no skin ulcer [No Xanthoma] : no  xanthoma was observed [Oriented To Time, Place, And Person] : oriented to person, place, and time [Affect] : the affect was normal [Mood] : the mood was normal [No Anxiety] : not feeling anxious [FreeTextEntry1] : Plaque psoriasis

## 2021-02-10 NOTE — REVIEW OF SYSTEMS
[Negative] : Heme/Lymph [see HPI] : see HPI [Chest Pain] : chest pain [Shortness Of Breath] : no shortness of breath [Palpitations] : no palpitations

## 2021-05-12 ENCOUNTER — APPOINTMENT (OUTPATIENT)
Dept: CARDIOLOGY | Facility: CLINIC | Age: 56
End: 2021-05-12
Payer: MEDICAID

## 2021-05-12 PROCEDURE — 93015 CV STRESS TEST SUPVJ I&R: CPT

## 2022-01-25 ENCOUNTER — EMERGENCY (EMERGENCY)
Facility: HOSPITAL | Age: 57
LOS: 1 days | Discharge: HOME | End: 2022-01-25
Attending: EMERGENCY MEDICINE | Admitting: EMERGENCY MEDICINE
Payer: MEDICAID

## 2022-01-25 VITALS
SYSTOLIC BLOOD PRESSURE: 154 MMHG | DIASTOLIC BLOOD PRESSURE: 100 MMHG | HEART RATE: 78 BPM | TEMPERATURE: 98 F | OXYGEN SATURATION: 100 % | HEIGHT: 64 IN | RESPIRATION RATE: 18 BRPM

## 2022-01-25 DIAGNOSIS — Z98.89 OTHER SPECIFIED POSTPROCEDURAL STATES: Chronic | ICD-10-CM

## 2022-01-25 LAB
ALBUMIN SERPL ELPH-MCNC: 4.2 G/DL — SIGNIFICANT CHANGE UP (ref 3.3–5)
ALP SERPL-CCNC: 90 U/L — SIGNIFICANT CHANGE UP (ref 40–120)
ALT FLD-CCNC: 107 U/L — HIGH (ref 4–41)
ANION GAP SERPL CALC-SCNC: 9 MMOL/L — SIGNIFICANT CHANGE UP (ref 7–14)
AST SERPL-CCNC: 61 U/L — HIGH (ref 4–40)
BASOPHILS # BLD AUTO: 0.04 K/UL — SIGNIFICANT CHANGE UP (ref 0–0.2)
BASOPHILS NFR BLD AUTO: 0.6 % — SIGNIFICANT CHANGE UP (ref 0–2)
BILIRUB SERPL-MCNC: 0.3 MG/DL — SIGNIFICANT CHANGE UP (ref 0.2–1.2)
BUN SERPL-MCNC: 14 MG/DL — SIGNIFICANT CHANGE UP (ref 7–23)
CALCIUM SERPL-MCNC: 9.1 MG/DL — SIGNIFICANT CHANGE UP (ref 8.4–10.5)
CHLORIDE SERPL-SCNC: 101 MMOL/L — SIGNIFICANT CHANGE UP (ref 98–107)
CO2 SERPL-SCNC: 23 MMOL/L — SIGNIFICANT CHANGE UP (ref 22–31)
CREAT SERPL-MCNC: 0.72 MG/DL — SIGNIFICANT CHANGE UP (ref 0.5–1.3)
EOSINOPHIL # BLD AUTO: 0.15 K/UL — SIGNIFICANT CHANGE UP (ref 0–0.5)
EOSINOPHIL NFR BLD AUTO: 2.1 % — SIGNIFICANT CHANGE UP (ref 0–6)
GLUCOSE SERPL-MCNC: 227 MG/DL — HIGH (ref 70–99)
HCT VFR BLD CALC: 44.2 % — SIGNIFICANT CHANGE UP (ref 39–50)
HGB BLD-MCNC: 14.3 G/DL — SIGNIFICANT CHANGE UP (ref 13–17)
IANC: 4.02 K/UL — SIGNIFICANT CHANGE UP (ref 1.5–8.5)
IMM GRANULOCYTES NFR BLD AUTO: 0.7 % — SIGNIFICANT CHANGE UP (ref 0–1.5)
LYMPHOCYTES # BLD AUTO: 2.47 K/UL — SIGNIFICANT CHANGE UP (ref 1–3.3)
LYMPHOCYTES # BLD AUTO: 34.7 % — SIGNIFICANT CHANGE UP (ref 13–44)
MCHC RBC-ENTMCNC: 27.3 PG — SIGNIFICANT CHANGE UP (ref 27–34)
MCHC RBC-ENTMCNC: 32.4 GM/DL — SIGNIFICANT CHANGE UP (ref 32–36)
MCV RBC AUTO: 84.5 FL — SIGNIFICANT CHANGE UP (ref 80–100)
MONOCYTES # BLD AUTO: 0.38 K/UL — SIGNIFICANT CHANGE UP (ref 0–0.9)
MONOCYTES NFR BLD AUTO: 5.3 % — SIGNIFICANT CHANGE UP (ref 2–14)
NEUTROPHILS # BLD AUTO: 4.02 K/UL — SIGNIFICANT CHANGE UP (ref 1.8–7.4)
NEUTROPHILS NFR BLD AUTO: 56.6 % — SIGNIFICANT CHANGE UP (ref 43–77)
NRBC # BLD: 0 /100 WBCS — SIGNIFICANT CHANGE UP
NRBC # FLD: 0 K/UL — SIGNIFICANT CHANGE UP
PLATELET # BLD AUTO: 227 K/UL — SIGNIFICANT CHANGE UP (ref 150–400)
POTASSIUM SERPL-MCNC: 3.8 MMOL/L — SIGNIFICANT CHANGE UP (ref 3.5–5.3)
POTASSIUM SERPL-SCNC: 3.8 MMOL/L — SIGNIFICANT CHANGE UP (ref 3.5–5.3)
PROT SERPL-MCNC: 7.5 G/DL — SIGNIFICANT CHANGE UP (ref 6–8.3)
RBC # BLD: 5.23 M/UL — SIGNIFICANT CHANGE UP (ref 4.2–5.8)
RBC # FLD: 13.8 % — SIGNIFICANT CHANGE UP (ref 10.3–14.5)
SODIUM SERPL-SCNC: 133 MMOL/L — LOW (ref 135–145)
TROPONIN T, HIGH SENSITIVITY RESULT: <6 NG/L — SIGNIFICANT CHANGE UP
WBC # BLD: 7.11 K/UL — SIGNIFICANT CHANGE UP (ref 3.8–10.5)
WBC # FLD AUTO: 7.11 K/UL — SIGNIFICANT CHANGE UP (ref 3.8–10.5)

## 2022-01-25 PROCEDURE — 71046 X-RAY EXAM CHEST 2 VIEWS: CPT | Mod: 26

## 2022-01-25 PROCEDURE — 99285 EMERGENCY DEPT VISIT HI MDM: CPT

## 2022-01-25 NOTE — ED PROVIDER NOTE - ATTENDING CONTRIBUTION TO CARE
I have personally performed a face to face bedside history and physical examination of this patient. I have discussed the history, examination, review of systems, assessment and plan of management with the resident. I have reviewed the electronic medical record and amended it to reflect my history, review of systems, physical exam, assessment and plan.    Brief HPI:  58 yo M pmh HTN, RA, psoriasis, afib on asa for AC presents to the ED for L sided chest pain x3 days.      Vitals:   Reviewed    Exam:    GEN:  Non-toxic appearing, non-distressed, speaking full sentences, non-diaphoretic, AAOx3  HEENT:  NCAT, neck supple, EOMI, PERRLA, sclera anicteric, no conjunctival pallor or injection, no stridor, normal voice, no tonsillar exudate, uvula midline  CV:  regular rhythm and rate, s1/s2 audible, no murmurs, rubs or gallops, peripheral pulses 2+ and symmetric  PULM:  non-labored respirations, lungs clear to auscultation bilaterally, no wheezes, crackles or rales  ABD:  non distended, non-tender, no rebound, no guarding, negative Hendricks's sign, bowel sounds normal, no cvat  MSK:  no gross deformity, non-tender extremities and joints, range of motion grossly normal appearing, no extremity edema, extremities warm and well perfused   NEURO:  AAOx3, CN II-XII intact, motor 5/5 in upper and lower extremities bilaterally, sensation grossly intact in extremities and trunk  SKIN:  warm, dry, no rash or vesicles     A/P:  58 yo M pmh HTN, RA, psoriasis, afib on asa for AC presents to the ED for L sided chest pain x3 days.  EKG non-ischemic; no morphology consistent with Brugada syndrome, Valdez-Parkinson-White syndrome, prolonged qt interval, HOCM, or arrythmogenic right ventricular hyperplasia.  VSS.  Low concern for acute coronary syndrome as chest pain non-exertional, non-radiating, and there is no nausea or diaphoresis.  Low concern for aortic dissection as chest pain non-acute onset, not radiating to back, not described as "tearing", no pulse or neuro deficit on exam.  Low c/f PE.  Will send labs, cxr.  HEART score <4 if trop neg.  Dispo pending.

## 2022-01-25 NOTE — ED PROVIDER NOTE - PROGRESS NOTE DETAILS
Subjective   Chief Complaint   Patient presents with   • Follow-up       Hung Ramsay is a 67 y.o. male who presents for Follow-up   Diabetes   He presents for his follow-up diabetic visit. He has type 2 diabetes mellitus. There are no hypoglycemic associated symptoms. Pertinent negatives for diabetes include no chest pain, no fatigue, no foot paresthesias, no foot ulcerations, no polydipsia, no polyphagia and no polyuria. There are no hypoglycemic complications. Current diabetic treatment includes oral agent (dual therapy). He is following a generally healthy diet. His breakfast blood glucose range is generally 130-140 mg/dl. An ACE inhibitor/angiotensin II receptor blocker is being taken.   Hypertension   This is a chronic problem. Pertinent negatives include no chest pain, orthopnea, palpitations, peripheral edema or shortness of breath. There are no associated agents to hypertension. Past treatments include angiotensin blockers and diuretics. There are no compliance problems.        The following portions of the patient's history were reviewed and updated as appropriate:problem list, current medications, allergies, past family history, past medical history, past social history and past surgical history    Past Medical History:   Diagnosis Date   • Anxiety    • Diabetes    • Essential hypertension    • GERD (gastroesophageal reflux disease)    • Insomnia        Social History   Substance Use Topics   • Smoking status: Former Smoker   • Smokeless tobacco: Former User   • Alcohol use No       Medications:    Current Outpatient Prescriptions:   •  aspirin 81 MG EC tablet, Take 81 mg by mouth Daily., Disp: , Rfl:   •  esomeprazole (nexIUM) 40 MG capsule, Take 1 capsule by mouth Every Morning Before Breakfast., Disp: 90 capsule, Rfl: 1  •  fenofibrate (TRICOR) 145 MG tablet, Take 1 tablet by mouth Daily., Disp: 90 tablet, Rfl: 1  •  glipiZIDE (GLUCOTROL) 5 MG tablet, Take 0.5 tablets by mouth 2 (Two) Times a  "Day Before Meals., Disp: 60 tablet, Rfl: 5  •  losartan-hydrochlorothiazide (HYZAAR) 100-25 MG per tablet, Take 1 tablet by mouth Daily., Disp: 90 tablet, Rfl: 1  •  metFORMIN (GLUCOPHAGE) 1000 MG tablet, Take 1 tablet by mouth 2 (Two) Times a Day With Meals., Disp: 180 tablet, Rfl: 1  •  metoprolol succinate XL (TOPROL-XL) 100 MG 24 hr tablet, Take 1 tablet by mouth Daily., Disp: 90 tablet, Rfl: 1  •  PARoxetine CR (PAXIL-CR) 25 MG 24 hr tablet, Take 1 tablet by mouth Daily., Disp: 90 tablet, Rfl: 1  •  traZODone (DESYREL) 50 MG tablet, Take 1 tablet by mouth Every Night., Disp: 90 tablet, Rfl: 1    No Known Allergies    Review of Systems   Constitutional: Negative for fatigue.   Respiratory: Negative for shortness of breath.    Cardiovascular: Negative for chest pain, palpitations, orthopnea and leg swelling.   Endocrine: Negative for polydipsia, polyphagia and polyuria.   Genitourinary: Negative.    Psychiatric/Behavioral: Negative.        Objective   Visit Vitals   • /82   • Pulse 60   • Ht 68\" (172.7 cm)   • Wt 228 lb 3.2 oz (104 kg)   • SpO2 98%   • BMI 34.7 kg/m2       Physical Exam   Constitutional: He appears well-developed and well-nourished. No distress.   HENT:   Head: Normocephalic.   Cardiovascular: Normal rate, regular rhythm and normal heart sounds.  Exam reveals no gallop and no friction rub.    No murmur heard.  Pulmonary/Chest: Effort normal and breath sounds normal. He has no wheezes. He has no rales.   Musculoskeletal: He exhibits no edema.   Neurological: He is alert.   Skin: Skin is warm and dry. He is not diaphoretic.   Psychiatric: He has a normal mood and affect. His behavior is normal.   Nursing note and vitals reviewed.      Assessment/Plan   Hung Ramsay is a 67 y.o. male seen today for the followin. Essential hypertension  Controlled. Continue current    - pravastatin (PRAVACHOL) 40 MG tablet; Take 0.5 tab po daily x 1 week, then 1 tab po daily  Dispense: 90 tablet; " Refill: 3    2. Type 2 diabetes mellitus without complication, without long-term current use of insulin  Controlled. Last labs discussed  Lab Results   Component Value Date    HGBA1C 6.79 (H) 02/20/2017       - pravastatin (PRAVACHOL) 40 MG tablet; Take 0.5 tab po daily x 1 week, then 1 tab po daily  Dispense: 90 tablet; Refill: 3    3. Hyperlipidemia, unspecified hyperlipidemia type  D/C fenofibrate and start statin. Repeat labs in 3 months.    - pravastatin (PRAVACHOL) 40 MG tablet; Take 0.5 tab po daily x 1 week, then 1 tab po daily  Dispense: 90 tablet; Refill: 3    Follow up: Return in about 3 months (around 7/3/2017) for Follow up Diabetes, labs few days before.          This document has been electronically signed by Nithya Martini DO on April 17, 2017 8:55 AM         Dennis Caceres, DO PGY-2: EKG unchanged from previous, cxr unremarkable, concern for clinically significant PE minimal, dc home w/ cardiology f.u

## 2022-01-25 NOTE — ED PROVIDER NOTE - NSFOLLOWUPINSTRUCTIONS_ED_ALL_ED_FT
You were seen in the Emergency Department for chest pain.    Follow up with your cardiologist.    Follow up with your primary care provider within 3-5 days.     You may take 650 mg Tylenol (acetaminophen) every eight hours as needed for pain.     If you have fever, chills, nausea, vomiting, new or worsening pain, or if you have any new symptoms return to the Emergency Department.

## 2022-01-25 NOTE — ED PROVIDER NOTE - CLINICAL SUMMARY MEDICAL DECISION MAKING FREE TEXT BOX
57M pmh HTN, RA, psoriasis, afib only on asa for AC presesnts to the ED for L sided chest pain x3 days w/ no associated n/v, no diaphoresis, no exertional componont, no weakness, no lightheadedness, no calf tenderness, no reproducible chest pain, no suspicion for dvt, low suspicion dvt, low concern ACS but r.o w/ trop, labs, cxr, likely DC home

## 2022-01-25 NOTE — ED ADULT NURSE NOTE - OBJECTIVE STATEMENT
patient Alert & ox3, Pt c/o left side chest pain x 2 days that is sometimes accompanied by SOB.  pt . evaluated by MD. Placed 20g angiocath Lt. AC., labs drawn and sent. patient will be waiting for results, further evaluation and disposition.  made comfortable.will continue to monitor.

## 2022-01-25 NOTE — ED PROVIDER NOTE - CCCP TRG CHIEF CMPLNT
chest pain Cephalexin Pregnancy And Lactation Text: This medication is Pregnancy Category B and considered safe during pregnancy.  It is also excreted in breast milk but can be used safely for shorter doses.

## 2022-01-25 NOTE — ED PROVIDER NOTE - OBJECTIVE STATEMENT
57M pmh HTN, RA, psoriasis, afib only on asa for AC presents to the ED for L sided chest pain x3 days w/ no associated n/v, no diaphoresis, no exertional component, no weakness, no lightheadedness, no calf tenderness. Pt states he works as a  and sits frequently but also moves around, no recent travel, no prolonged immobilization. Pt had a stress test within the past year which was normal as per pt. Pt denies any radiation of pain, denies any new rashes but states he has chronic psoriatic dermatitis.

## 2022-01-25 NOTE — ED ADULT TRIAGE NOTE - CHIEF COMPLAINT QUOTE
Pt c/o left side chest pain x 2 days that is sometimes accompanied by SOB. Last stress test 2021 was neg, PMH htn

## 2022-01-25 NOTE — ED PROVIDER NOTE - PHYSICAL EXAMINATION
CONSTITUTIONAL: NAD  SKIN: Warm dry, normal skin turgor, multiple chronic appearing scaly patches over back and chest, no vesicles noted  HEAD: NCAT  NECK: Supple; non tender. Full ROM.  CARD: RRR, no murmurs.  RESP: clear to ausculation b/l. No crackles or wheezing.  ABD: soft, non-tender, non-distended, no rebound or guarding.  MSK: Full ROM, no bony tenderness, no pedal edema, no calf tenderness  NEURO: normal motor. normal sensory. normal gait.  PSYCH: Cooperative, appropriate.

## 2022-01-25 NOTE — ED PROVIDER NOTE - PATIENT PORTAL LINK FT
You can access the FollowMyHealth Patient Portal offered by [facility] by registering at the following website: [email or web address]. By joining [facility]’s FollowMyHealth portal, you will also be able to view your health information using other applications (apps) compatible with our system.

## 2022-05-04 NOTE — ED PROVIDER NOTE - CARE PLAN
Received patient on bed, angry with people coming in and out of the room. With dried blood on the nose area and gown. Refused to be changed, refused vital signs on 4L O2 with 94% O2 sat. Refused repositioning. Asking for popsicles and told RN that there's  a box of popsicles in the fridge in the room. Will contiunue to monitor.    Principal Discharge DX:	Chest discomfort

## 2023-05-14 NOTE — ED PROVIDER NOTE - IV ALTEPLASE INCLUSION HIDDEN
[Right] : right elbow [de-identified] : RIGHT elbow examination:\par mild diffuse swelling /no  bony deformity\par   TTP over the radial neck /head region.\par ROM is full with discomfort\par Sensation is  to the  upper extremity is normal\par Strength is not assessed.\par There is no palpable deformity.\par \par  [FreeTextEntry1] : anterior fat pad sign show

## 2025-05-16 NOTE — ED ADULT NURSE NOTE - CCCP TRG CHIEF CMPLNT
Patient: Yanni DONOVAN Vitilie    Procedure Summary       Date: 05/16/25 Room / Location:  [facility] /  [facility]    Anesthesia Start: 1042 Anesthesia Stop: 1104    Procedure: ESOPHAGOGASTRODUODENOSCOPY (Esophagus) Diagnosis:       Gastrointestinal hemorrhage with melena      (Gastrointestinal hemorrhage with melena [K92.1])    Surgeons: Vicky Mccoy MD Provider: Gurjit Boateng DO    Anesthesia Type: MAC ASA Status: 4            Anesthesia Type: MAC    Vitals  Vitals Value Taken Time   /80 05/16/25 11:21   Temp     Pulse 89 05/16/25 11:24   Resp 14 05/16/25 11:21   SpO2 92 % 05/16/25 11:24   Vitals shown include unfiled device data.        Post Anesthesia Care and Evaluation    Patient location during evaluation: bedside  Patient participation: complete - patient participated  Level of consciousness: awake and alert  Pain management: adequate    Airway patency: patent  Anesthetic complications: No anesthetic complications  PONV Status: controlled  Cardiovascular status: acceptable and hemodynamically stable  Respiratory status: acceptable, spontaneous ventilation and nonlabored ventilation  Hydration status: acceptable    Comments: /80 (BP Location: Left arm, Patient Position: Sitting)   Pulse 79   Temp 36.7 °C (98 °F) (Oral)   Resp 14   Wt 42 kg (92 lb 9.6 oz)   SpO2 97%   BMI 16.94 kg/m²       
cough fever sob n/v/d

## 2025-06-12 NOTE — DISCHARGE NOTE PROVIDER - NSDCQMPCI_CARD_ALL_CORE
Problem: Fluid Volume Excess  Goal: Fluid Volume Excess Symptoms Resolved  Description: Treatment often consists of oxygen and respiratory support with diuretic therapy at doses that exceed usual dose (typically doubled).  Monitor patient response to treatment.  Acute dyspnea should resolve quickly if dose is adequate and kidney function is adequate. Dyspnea/SOB should only be observed with Activity after effective treatment. Patient should be able to lie down comfortably, without SOB.  Outcome: Met, complete goal     Problem: At Risk for Falls  Goal: Patient does not fall  Outcome: Met, complete goal  Goal: Patient takes action to control fall-related risks  Outcome: Met, complete goal     Problem: At Risk for Injury Due to Fall  Goal: Patient does not fall  Outcome: Met, complete goal     Problem: At Risk for Injury Due to Fall  Goal: Patient does not fall  Outcome: Met, complete goal  Goal: Takes action to control condition specific risks  Outcome: Met, complete goal  Goal: Verbalizes understanding of fall-related injury personal risks  Description: Document education using the patient education activity  Outcome: Met, complete goal     Problem: Alcohol Withdrawal Management  Goal: # No alcohol-related delirium or seizures  Outcome: Met, complete goal  Goal: # Verbalizes understanding of alcohol withdrawal and health effects of excessive alcohol intake  Description: Documented on patient education activity  Outcome: Met, complete goal      No

## 2025-08-30 ENCOUNTER — EMERGENCY (EMERGENCY)
Facility: HOSPITAL | Age: 60
LOS: 1 days | End: 2025-08-30
Attending: STUDENT IN AN ORGANIZED HEALTH CARE EDUCATION/TRAINING PROGRAM
Payer: SELF-PAY

## 2025-08-30 VITALS
SYSTOLIC BLOOD PRESSURE: 133 MMHG | HEART RATE: 116 BPM | OXYGEN SATURATION: 97 % | WEIGHT: 155.65 LBS | RESPIRATION RATE: 18 BRPM | TEMPERATURE: 98 F | DIASTOLIC BLOOD PRESSURE: 80 MMHG

## 2025-08-30 DIAGNOSIS — Z98.89 OTHER SPECIFIED POSTPROCEDURAL STATES: Chronic | ICD-10-CM

## 2025-08-30 PROCEDURE — 99284 EMERGENCY DEPT VISIT MOD MDM: CPT

## 2025-08-30 PROCEDURE — 99053 MED SERV 10PM-8AM 24 HR FAC: CPT

## 2025-08-31 VITALS
RESPIRATION RATE: 18 BRPM | DIASTOLIC BLOOD PRESSURE: 81 MMHG | SYSTOLIC BLOOD PRESSURE: 120 MMHG | OXYGEN SATURATION: 99 % | HEART RATE: 70 BPM | TEMPERATURE: 98 F

## 2025-08-31 PROCEDURE — 72125 CT NECK SPINE W/O DYE: CPT

## 2025-08-31 PROCEDURE — 93005 ELECTROCARDIOGRAM TRACING: CPT

## 2025-08-31 PROCEDURE — 99284 EMERGENCY DEPT VISIT MOD MDM: CPT | Mod: 25

## 2025-08-31 PROCEDURE — 72125 CT NECK SPINE W/O DYE: CPT | Mod: 26

## 2025-08-31 PROCEDURE — 70450 CT HEAD/BRAIN W/O DYE: CPT

## 2025-08-31 PROCEDURE — 70450 CT HEAD/BRAIN W/O DYE: CPT | Mod: 26

## 2025-08-31 RX ORDER — CYCLOBENZAPRINE HYDROCHLORIDE 15 MG/1
5 CAPSULE, EXTENDED RELEASE ORAL ONCE
Refills: 0 | Status: COMPLETED | OUTPATIENT
Start: 2025-08-31 | End: 2025-08-31

## 2025-08-31 RX ORDER — LIDOCAINE HYDROCHLORIDE 20 MG/ML
1 JELLY TOPICAL ONCE
Refills: 0 | Status: COMPLETED | OUTPATIENT
Start: 2025-08-31 | End: 2025-08-31

## 2025-08-31 RX ORDER — ACETAMINOPHEN 500 MG/5ML
1000 LIQUID (ML) ORAL ONCE
Refills: 0 | Status: DISCONTINUED | OUTPATIENT
Start: 2025-08-31 | End: 2025-08-31

## 2025-08-31 RX ORDER — ACETAMINOPHEN 500 MG/5ML
975 LIQUID (ML) ORAL ONCE
Refills: 0 | Status: COMPLETED | OUTPATIENT
Start: 2025-08-31 | End: 2025-08-31

## 2025-08-31 RX ADMIN — Medication 975 MILLIGRAM(S): at 02:01

## 2025-08-31 RX ADMIN — LIDOCAINE HYDROCHLORIDE 1 PATCH: 20 JELLY TOPICAL at 01:01

## 2025-08-31 RX ADMIN — CYCLOBENZAPRINE HYDROCHLORIDE 5 MILLIGRAM(S): 15 CAPSULE, EXTENDED RELEASE ORAL at 01:02

## 2025-08-31 RX ADMIN — Medication 975 MILLIGRAM(S): at 01:01
